# Patient Record
Sex: MALE | Race: WHITE | NOT HISPANIC OR LATINO | Employment: FULL TIME | URBAN - METROPOLITAN AREA
[De-identification: names, ages, dates, MRNs, and addresses within clinical notes are randomized per-mention and may not be internally consistent; named-entity substitution may affect disease eponyms.]

---

## 2017-01-27 ENCOUNTER — OFFICE VISIT (OUTPATIENT)
Dept: URGENT CARE | Facility: URGENT CARE | Age: 47
End: 2017-01-27
Payer: COMMERCIAL

## 2017-01-27 VITALS
HEART RATE: 107 BPM | DIASTOLIC BLOOD PRESSURE: 80 MMHG | BODY MASS INDEX: 23.08 KG/M2 | TEMPERATURE: 97.8 F | SYSTOLIC BLOOD PRESSURE: 148 MMHG | OXYGEN SATURATION: 98 % | WEIGHT: 142.9 LBS

## 2017-01-27 DIAGNOSIS — H57.12 EYE PAIN, LEFT: Primary | ICD-10-CM

## 2017-01-27 PROCEDURE — 99213 OFFICE O/P EST LOW 20 MIN: CPT | Performed by: FAMILY MEDICINE

## 2017-01-27 RX ORDER — CIPROFLOXACIN HYDROCHLORIDE 3.5 MG/ML
1 SOLUTION/ DROPS TOPICAL 3 TIMES DAILY
Qty: 1.1 ML | Refills: 0 | Status: SHIPPED | OUTPATIENT
Start: 2017-01-27 | End: 2017-02-03

## 2017-01-27 RX ORDER — HYDROCODONE BITARTRATE AND ACETAMINOPHEN 5; 325 MG/1; MG/1
1-2 TABLET ORAL EVERY 4 HOURS PRN
Qty: 20 TABLET | Refills: 0 | Status: SHIPPED | OUTPATIENT
Start: 2017-01-27 | End: 2017-02-23

## 2017-02-14 ENCOUNTER — APPOINTMENT (OUTPATIENT)
Dept: MRI IMAGING | Facility: CLINIC | Age: 47
End: 2017-02-14
Attending: EMERGENCY MEDICINE
Payer: COMMERCIAL

## 2017-02-14 ENCOUNTER — APPOINTMENT (OUTPATIENT)
Dept: GENERAL RADIOLOGY | Facility: CLINIC | Age: 47
End: 2017-02-14
Attending: EMERGENCY MEDICINE
Payer: COMMERCIAL

## 2017-02-14 ENCOUNTER — HOSPITAL ENCOUNTER (EMERGENCY)
Facility: CLINIC | Age: 47
Discharge: HOME | End: 2017-02-14
Attending: EMERGENCY MEDICINE | Admitting: EMERGENCY MEDICINE
Payer: COMMERCIAL

## 2017-02-14 VITALS
OXYGEN SATURATION: 100 % | HEIGHT: 66 IN | TEMPERATURE: 98 F | SYSTOLIC BLOOD PRESSURE: 124 MMHG | HEART RATE: 77 BPM | RESPIRATION RATE: 16 BRPM | BODY MASS INDEX: 22.5 KG/M2 | DIASTOLIC BLOOD PRESSURE: 84 MMHG | WEIGHT: 140 LBS

## 2017-02-14 DIAGNOSIS — R42 LIGHT HEADEDNESS: ICD-10-CM

## 2017-02-14 DIAGNOSIS — R20.0 ARM NUMBNESS: ICD-10-CM

## 2017-02-14 LAB
ALBUMIN SERPL-MCNC: 4 G/DL (ref 3.4–5)
ALBUMIN UR-MCNC: NEGATIVE MG/DL
ALP SERPL-CCNC: 69 U/L (ref 40–150)
ALT SERPL W P-5'-P-CCNC: 16 U/L (ref 0–70)
ANION GAP SERPL CALCULATED.3IONS-SCNC: 7 MMOL/L (ref 3–14)
APPEARANCE UR: CLEAR
AST SERPL W P-5'-P-CCNC: 10 U/L (ref 0–45)
BASOPHILS # BLD AUTO: 0 10E9/L (ref 0–0.2)
BASOPHILS NFR BLD AUTO: 0.2 %
BILIRUB SERPL-MCNC: 0.7 MG/DL (ref 0.2–1.3)
BILIRUB UR QL STRIP: NEGATIVE
BUN SERPL-MCNC: 10 MG/DL (ref 7–30)
CALCIUM SERPL-MCNC: 8.5 MG/DL (ref 8.5–10.1)
CHLORIDE SERPL-SCNC: 99 MMOL/L (ref 94–109)
CO2 SERPL-SCNC: 30 MMOL/L (ref 20–32)
COLOR UR AUTO: ABNORMAL
CREAT SERPL-MCNC: 0.84 MG/DL (ref 0.66–1.25)
DIFFERENTIAL METHOD BLD: NORMAL
EOSINOPHIL # BLD AUTO: 0.1 10E9/L (ref 0–0.7)
EOSINOPHIL NFR BLD AUTO: 0.6 %
ERYTHROCYTE [DISTWIDTH] IN BLOOD BY AUTOMATED COUNT: 12.6 % (ref 10–15)
GFR SERPL CREATININE-BSD FRML MDRD: ABNORMAL ML/MIN/1.7M2
GLUCOSE SERPL-MCNC: 106 MG/DL (ref 70–99)
GLUCOSE UR STRIP-MCNC: NEGATIVE MG/DL
HCT VFR BLD AUTO: 45.5 % (ref 40–53)
HGB BLD-MCNC: 15.8 G/DL (ref 13.3–17.7)
HGB UR QL STRIP: NEGATIVE
IMM GRANULOCYTES # BLD: 0 10E9/L (ref 0–0.4)
IMM GRANULOCYTES NFR BLD: 0.3 %
INTERPRETATION ECG - MUSE: NORMAL
KETONES UR STRIP-MCNC: NEGATIVE MG/DL
LEUKOCYTE ESTERASE UR QL STRIP: NEGATIVE
LYMPHOCYTES # BLD AUTO: 0.9 10E9/L (ref 0.8–5.3)
LYMPHOCYTES NFR BLD AUTO: 9.2 %
MCH RBC QN AUTO: 29.8 PG (ref 26.5–33)
MCHC RBC AUTO-ENTMCNC: 34.7 G/DL (ref 31.5–36.5)
MCV RBC AUTO: 86 FL (ref 78–100)
MONOCYTES # BLD AUTO: 0.7 10E9/L (ref 0–1.3)
MONOCYTES NFR BLD AUTO: 8 %
NEUTROPHILS # BLD AUTO: 7.6 10E9/L (ref 1.6–8.3)
NEUTROPHILS NFR BLD AUTO: 81.7 %
NITRATE UR QL: NEGATIVE
NRBC # BLD AUTO: 0 10*3/UL
NRBC BLD AUTO-RTO: 0 /100
PH UR STRIP: 7 PH (ref 5–7)
PLATELET # BLD AUTO: 258 10E9/L (ref 150–450)
POTASSIUM SERPL-SCNC: 3.7 MMOL/L (ref 3.4–5.3)
PROT SERPL-MCNC: 7.4 G/DL (ref 6.8–8.8)
RBC # BLD AUTO: 5.31 10E12/L (ref 4.4–5.9)
RBC #/AREA URNS AUTO: <1 /HPF (ref 0–2)
SODIUM SERPL-SCNC: 136 MMOL/L (ref 133–144)
SP GR UR STRIP: 1 (ref 1–1.03)
SQUAMOUS #/AREA URNS AUTO: <1 /HPF (ref 0–1)
TROPONIN I SERPL-MCNC: NORMAL UG/L (ref 0–0.04)
TSH SERPL DL<=0.005 MIU/L-ACNC: 1.07 MU/L (ref 0.4–4)
URN SPEC COLLECT METH UR: ABNORMAL
UROBILINOGEN UR STRIP-MCNC: NORMAL MG/DL (ref 0–2)
WBC # BLD AUTO: 9.3 10E9/L (ref 4–11)
WBC #/AREA URNS AUTO: 1 /HPF (ref 0–2)

## 2017-02-14 PROCEDURE — 70200 X-RAY EXAM OF EYE SOCKETS: CPT

## 2017-02-14 PROCEDURE — 85025 COMPLETE CBC W/AUTO DIFF WBC: CPT | Performed by: EMERGENCY MEDICINE

## 2017-02-14 PROCEDURE — 84484 ASSAY OF TROPONIN QUANT: CPT | Performed by: EMERGENCY MEDICINE

## 2017-02-14 PROCEDURE — 81001 URINALYSIS AUTO W/SCOPE: CPT | Performed by: EMERGENCY MEDICINE

## 2017-02-14 PROCEDURE — 80053 COMPREHEN METABOLIC PANEL: CPT | Performed by: EMERGENCY MEDICINE

## 2017-02-14 PROCEDURE — 25000128 H RX IP 250 OP 636: Performed by: EMERGENCY MEDICINE

## 2017-02-14 PROCEDURE — 84443 ASSAY THYROID STIM HORMONE: CPT | Performed by: EMERGENCY MEDICINE

## 2017-02-14 PROCEDURE — 70553 MRI BRAIN STEM W/O & W/DYE: CPT

## 2017-02-14 PROCEDURE — 93005 ELECTROCARDIOGRAM TRACING: CPT

## 2017-02-14 PROCEDURE — 96360 HYDRATION IV INFUSION INIT: CPT | Mod: 59

## 2017-02-14 PROCEDURE — 99285 EMERGENCY DEPT VISIT HI MDM: CPT | Mod: 25

## 2017-02-14 PROCEDURE — 25500064 ZZH RX 255 OP 636: Performed by: RADIOLOGY

## 2017-02-14 PROCEDURE — 96361 HYDRATE IV INFUSION ADD-ON: CPT

## 2017-02-14 PROCEDURE — A9585 GADOBUTROL INJECTION: HCPCS | Performed by: RADIOLOGY

## 2017-02-14 RX ORDER — GADOBUTROL 604.72 MG/ML
6 INJECTION INTRAVENOUS ONCE
Status: COMPLETED | OUTPATIENT
Start: 2017-02-14 | End: 2017-02-14

## 2017-02-14 RX ORDER — SODIUM CHLORIDE 9 MG/ML
1000 INJECTION, SOLUTION INTRAVENOUS CONTINUOUS
Status: DISCONTINUED | OUTPATIENT
Start: 2017-02-14 | End: 2017-02-14 | Stop reason: HOSPADM

## 2017-02-14 RX ADMIN — GADOBUTROL 6 ML: 604.72 INJECTION INTRAVENOUS at 11:56

## 2017-02-14 RX ADMIN — SODIUM CHLORIDE 1000 ML: 9 INJECTION, SOLUTION INTRAVENOUS at 10:32

## 2017-02-14 ASSESSMENT — ENCOUNTER SYMPTOMS
VOMITING: 0
NAUSEA: 0
HEADACHES: 0
NUMBNESS: 1
DIZZINESS: 1
WEAKNESS: 0
NECK PAIN: 0
BLOOD IN STOOL: 0
DIARRHEA: 1
LIGHT-HEADEDNESS: 1

## 2017-02-14 NOTE — ED PROVIDER NOTES
History     Chief Complaint:  Dizziness and Numbness    HPI   Nhan Bryan is a 46 year old male with a history of neurofibromatosis and an astrocytoma brain tumor who presents to the emergency department today for evaluation of dizziness and numbness. The patient has been feeling dizzy and with some numbness in his arms bilaterally for about the past 1.5 weeks. He feels that his dizziness is more of a lightheadedness and that it worsens when he doesn't eat as much. He has been experiencing diarrhea for the past few weeks with about one loose stool per day. He feels that this is improved after he gets his back adjusted by his chiropractor. He has had no blood in his stool. He also feels as though he is eating more than he usually does but is not gaining weight. He denies headache, nausea or vomiting. He does not feel weak stating that he feels he has energy. His legs feel fine and his walking has been normal. The patient has a benign left temporal brain tumor and associated seizures and notes he is taking generic Keppra to treat this. He has no neck pain.     Allergies:  No Known Drug Allergies     Medications:    Lisinopril  Keppra    Past Medical History:    Astrocytoma brain tumour  Epilepsy   Neurofibromatosis, peripheral   Seizure disorder     Past Surgical History:    External ear surgery     Family History:    Mother - Colorectal cancer  Father - Colorectal cancer    Social History:  The patient was accompanied to the ED by a family member.  Smoking Status: Former Smoker, quit in 2002  Smokeless Tobacco: Negative  Alcohol Use: Negative  Marital Status:  Single [1]     Review of Systems   Gastrointestinal: Positive for diarrhea. Negative for blood in stool, nausea and vomiting.   Musculoskeletal: Negative for neck pain.   Neurological: Positive for dizziness, light-headedness and numbness (bilateral arms). Negative for weakness and headaches.   All other systems reviewed and are negative.    Physical  "Exam   Vitals:   Patient Vitals for the past 24 hrs:   BP Temp Temp src Pulse SpO2 Height Weight   02/14/17 1300 124/84 - - - - - -   02/14/17 1254 130/82 - - - - - -   02/14/17 1232 (!) 140/98 - - - - - -   02/14/17 0939 (!) 140/97 98  F (36.7  C) Oral 75 98 % 1.676 m (5' 6\") 63.5 kg (140 lb)     Physical Exam    General: Resting comfortably on the gurney  Eyes:  The pupils are equal and round    Conjunctivae and sclerae are normal  ENT:    The nose is normal    Pinnae are normal    The oropharynx is normal  Neck:  Normal range of motion    There is no rigidity noted    There is no midline cervical spine tenderness    Trachea is in the midline  CV:  Regular rate and rhythm     No edema  Resp:  Lungs are clear    Non-labored    No rales    No wheezing   GI:  Abdomen is soft, there is no rigidity    No distension    No rebound tenderness   MS:  Normal muscular tone    No asymmetric leg swelling  Skin:  No rash noted    Multiple skin lesions throughout body.  Neuro:   Awake, alert, GCS 15    Speech is normal and fluent    Face is symmetric    Moves all extremities    Normal finger-nose-finger     strength equal bilaterally    Equal sensation bilaterally    Hip flexion 5/5 bilaterally    Emergency Department Course     ECG:  ECG taken at 1010, ECG read at 1015  Normal sinus rhythm  Left anterior fascicular block  Rate 81 bpm. NV interval 150. QRS duration 106. QT/QTc 378/439. P-R-T axes 46 -57 39.    Imaging:  Radiology findings were communicated with the patient who voiced understanding of the findings.    XR Orbits G/E 3 Views:   IMPRESSION: No radiodense foreign bodies are seen. No fractures  identified.  Reading per radiology    MR Brain w/o & w Contrast:   IMPRESSION:  1. No specific etiology of the dizziness bilateral arm numbness can be  identified.  2. No change in the small enhancing lesion at the anterior pole of the  left temporal lobe, probably a benign neoplasm versus a capillary  telangiectasia.  3. " Numerous subcutaneous nodules consistent with neurofibromatosis.  Reading per radiology    Laboratory:  Laboratory findings were communicated with the patient who voiced understanding of the findings.    Troponin (Collected 1033): <0.015  CMP: Glucose 106 (H) (Creatinine: 0.84)  CBC: AWNL. (WBC 9.3, HGB 15.8, )   TSH: 1.07    UA with Microscopic reflex to Culture: Specific Gravity Urine: 1.002 (L)    Interventions:  1032 ns 1000 mL IV     Emergency Department Course:  Nursing notes and vitals reviewed.  I performed an exam of the patient as documented above.   IV was inserted and blood was drawn for laboratory testing, results above.  The patient was sent for an x-ray and MRI while in the emergency department, results above.   The patient provided a urine sample here in the emergency department. This was sent for laboratory testing, findings above.  At 1234 the patient was rechecked and was updated on the results of his laboratory and imaging studies.  1258 Patient was updated on the result of his TSH.   I discussed the treatment plan with the patient. They expressed understanding of this plan and consented to discharge. They will be discharged home with instructions for care and follow up. In addition, the patient will return to the emergency department if their symptoms persist, worsen, if new symptoms arise or if there is any concern.  All questions were answered.  I personally reviewed the laboratory and imaging results with the patient and answered all related questions prior to discharge.    Impression & Plan      Medical Decision Making:  Nhan Bryan is a 46 year old male who presents to the emergency department with dizziness. 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 He's here with dizziness and bilateral arm numbness. This has been ongoing for about a week and a half. He feels lightheaded and it worsens when he does not have a full stomach. No chest pain or  trouble breathing. He has had some loose stools. His neurologic exam here is normal. Laboratory work up here as well as ECG showed no concerning findings. He does appear dehydrated and there's no evidence of infection, troponin elevation, thyroid abnormality, anemia, or white blood cell count elevation. MRI was obtained to ensure no evidence of worsening tumor or other finding that would cause these symptoms centrally and this was fortunately negative. Overall, the patient's vital signs appear normal. Work up here was overall benign. Unclear of the exact cause but the patient will continue to drink plenty of fluids and follow up with his doctor. He is discharged to home.     Diagnosis:    ICD-10-CM    1. Light headedness R42    2. Arm numbness R20.0      Disposition:   Discharge to home    Scribe Disclosure:  Radha LOPEZ, am serving as a scribe at 9:48 AM on 2/14/2017 to document services personally performed by Wicho Em MD, based on my observations and the provider's statements to me.    2/14/2017    EMERGENCY DEPARTMENT       Wicho Em MD  02/14/17 9364

## 2017-02-14 NOTE — ED AVS SNAPSHOT
Emergency Department    [address] [facility] [address]    Phone:  [phone]    Fax:  [phone]                                       Nhan Bryan   MRN: [id number]    Department:   Emergency Department   Date of Visit:  2/14/2017           Patient Information     Date Of Birth          1970        Your diagnoses for this visit were:     Light headedness     Arm numbness        You were seen by Wicho Em MD.      Follow-up Information     Follow up with Claudy Juarez MD In 3 days.    Specialty:  Family Practice    Contact information:    [facility]  [address]  [facility] [address] [phone]          Follow up with  Emergency Department.    Specialty:  EMERGENCY MEDICINE    Why:  As needed    Contact information:    [address] [facility] [address] [phone]        Discharge Instructions         Dizziness (Uncertain Cause)  Dizziness is a common symptom. It may be described as lightheadedness, spinning, or feeling like you are going to faint. Dizziness can have many causes.  Be sure to tell the healthcare provider about:    All medicines you take, including prescription, over-the-counter, herbs, and supplements    Any other symptoms you have    Any health problems you are being treated for    Anything that causes the dizziness to get worse or better  Today's exam did not show an exact cause for your dizziness. Other tests may be needed. Follow up with your healthcare provider.  Home care    Dizziness that occurs with sudden standing may be a sign of mild dehydration. Drink extra fluids for the next few days.    If you recently started a new medicine, stopped a medicine, or had the dose of a current medicine changed, talk with the prescribing healthcare provider. Your medicine plan may need adjustment.    If dizziness lasts more than a few seconds, sit or lie down until it passes. This may help prevent  injury in case you pass out.    Do not drive or use power tools or dangerous equipment until you have had no dizziness for at least 48 hours.  Follow-up care  Follow up with your healthcare provider for further evaluation within the next 7 days or as advised.  When to seek medical advice  Call your healthcare provider for any of the following:    Worsening of symptoms or new symptoms    Passing out or seizure    Repeated vomiting    Headache    Palpitations (the sense that your heart is fluttering or beating fast or hard)    Shortness of breath    Blood in vomit or stool (black or red color)    Weakness of an arm or leg or one side of the face    Vision or hearing changes    Trouble walking or speaking    Chest, arm, neck, back, or jaw pain       3158-6968 [employer]. [address] [place] [address], [place] [address]. All rights reserved. This information is not intended as a substitute for professional medical care. Always follow your healthcare professional's instructions.          24 Hour Appointment Hotline       To make an appointment at any [facility], call [phone] ([phone]). If you don't have a family doctor or clinic, we will help you find one. [facility] are conveniently located to serve the needs of you and your family.             Review of your medicines      Our records show that you are taking the medicines listed below. If these are incorrect, please call your family doctor or clinic.        Dose / Directions Last dose taken    HYDROcodone-acetaminophen 5-325 MG per tablet   Commonly known as:  NORCO   Dose:  1-2 tablet   Quantity:  20 tablet        Take 1-2 tablets by mouth every 4 hours as needed for moderate to severe pain maximum 3 tablet(s) per day   Refills:  0        * levETIRAcetam 500 MG tablet   Commonly known as:  KEPPRA   Dose:  500 mg   Quantity:  60 tablet        Take 1 tablet (500 mg) by mouth At Bedtime   Refills:  0        * levETIRAcetam 750 MG tablet    Commonly known as:  KEPPRA   Dose:  1500 mg        Take 2 tablets (1,500 mg) by mouth 2 times daily   Refills:  0        lisinopril 20 MG tablet   Commonly known as:  PRINIVIL/ZESTRIL   Dose:  20 mg   Quantity:  30 tablet        Take 1 tablet (20 mg) by mouth daily   Refills:  11        * Notice:  This list has 2 medication(s) that are the same as other medications prescribed for you. Read the directions carefully, and ask your doctor or other care provider to review them with you.            Procedures and tests performed during your visit     CBC with platelets + differential    Comprehensive metabolic panel    EKG 12-lead, tracing only    MR Brain w/o & w Contrast    TSH with free T4 reflex    Troponin I    UA with Microscopic reflex to Culture    XR Orbits G/E 3 Views      Orders Needing Specimen Collection     None      Pending Results     Date and Time Order Name Status Description    2/14/2017 1001 MR Brain w/o & w Contrast Preliminary             Pending Culture Results     No orders found from 2/12/2017 to 2/15/2017.             Test Results from your hospital stay     2/14/2017 10:44 AM - Interface, [facility] Results      Component Results     Component Value Ref Range & Units Status    WBC 9.3 4.0 - 11.0 10e9/L Final    RBC Count 5.31 4.4 - 5.9 10e12/L Final    Hemoglobin 15.8 13.3 - 17.7 g/dL Final    Hematocrit 45.5 40.0 - 53.0 % Final    MCV 86 78 - 100 fl Final    MCH 29.8 26.5 - 33.0 pg Final    MCHC 34.7 31.5 - 36.5 g/dL Final    RDW 12.6 10.0 - 15.0 % Final    Platelet Count 258 150 - 450 10e9/L Final    Diff Method Automated Method  Final    % Neutrophils 81.7 % Final    % Lymphocytes 9.2 % Final    % Monocytes 8.0 % Final    % Eosinophils 0.6 % Final    % Basophils 0.2 % Final    % Immature Granulocytes 0.3 % Final    Nucleated RBCs 0 0 /100 Final    Absolute Neutrophil 7.6 1.6 - 8.3 10e9/L Final    Absolute Lymphocytes 0.9 0.8 - 5.3 10e9/L Final    Absolute Monocytes 0.7 0.0 - 1.3 10e9/L Final     Absolute Eosinophils 0.1 0.0 - 0.7 10e9/L Final    Absolute Basophils 0.0 0.0 - 0.2 10e9/L Final    Abs Immature Granulocytes 0.0 0 - 0.4 10e9/L Final    Absolute Nucleated RBC 0.0  Final         2/14/2017 11:04 AM - Interface, [employer] Results      Component Results     Component Value Ref Range & Units Status    Sodium 136 133 - 144 mmol/L Final    Potassium 3.7 3.4 - 5.3 mmol/L Final    Chloride 99 94 - 109 mmol/L Final    Carbon Dioxide 30 20 - 32 mmol/L Final    Anion Gap 7 3 - 14 mmol/L Final    Glucose 106 (H) 70 - 99 mg/dL Final    Urea Nitrogen 10 7 - 30 mg/dL Final    Creatinine 0.84 0.66 - 1.25 mg/dL Final    GFR Estimate >90  Non  GFR Calc   >60 mL/min/1.7m2 Final    GFR Estimate If Black >90   GFR Calc   >60 mL/min/1.7m2 Final    Calcium 8.5 8.5 - 10.1 mg/dL Final    Bilirubin Total 0.7 0.2 - 1.3 mg/dL Final    Albumin 4.0 3.4 - 5.0 g/dL Final    Protein Total 7.4 6.8 - 8.8 g/dL Final    Alkaline Phosphatase 69 40 - 150 U/L Final    ALT 16 0 - 70 U/L Final    AST 10 0 - 45 U/L Final         2/14/2017 12:23 PM - Interface, Radiant Ib      Narrative     MRI BRAIN WITHOUT AND WITH CONTRAST February 14, 2017 12:12 PM    HISTORY: Neurofibromatosis type I. History of left temporal lobe brain  tumor. Dizziness and bilateral arm numbness.     TECHNIQUE: Multiplanar, multisequence MRI of the brain without and  with 6mL Gadavist.    COMPARISON: MRI 10/9/2004.    FINDINGS: Extensive subcutaneous nodules are noted consistent with the  clinically apparent neurofibromatosis type I.    The lesion in the left temporal lobe anteriorly shows relatively  poorly marginated gadolinium enhancement, 0.7 x 0.5 x 0.6 cm diameter,  abutting the cortex. This is unchanged back to 2004 and thus is  benign. No other gadolinium enhancing lesions are seen in the brain or  meninges. Ventricles and subarachnoid spaces appear normal. The  arteries at the base of the brain and the dural venous sinuses  are  patent. Facial structures are unremarkable. No evidence of skull base  abnormality.        Impression     IMPRESSION:  1. No specific etiology of the dizziness bilateral arm numbness can be  identified.  2. No change in the small enhancing lesion at the anterior pole of the  left temporal lobe, probably a benign neoplasm versus a capillary  telangiectasia.  3. Numerous subcutaneous nodules consistent with neurofibromatosis.         2/14/2017 10:46 AM - Interface, [facility] Results      Component Results     Component Value Ref Range & Units Status    Color Urine Light Yellow  Final    Appearance Urine Clear  Final    Glucose Urine Negative NEG mg/dL Final    Bilirubin Urine Negative NEG Final    Ketones Urine Negative NEG mg/dL Final    Specific Gravity Urine 1.002 (L) 1.003 - 1.035 Final    Blood Urine Negative NEG Final    pH Urine 7.0 5.0 - 7.0 pH Final    Protein Albumin Urine Negative NEG mg/dL Final    Urobilinogen mg/dL Normal 0.0 - 2.0 mg/dL Final    Nitrite Urine Negative NEG Final    Leukocyte Esterase Urine Negative NEG Final    Source Midstream Urine  Final    WBC Urine 1 0 - 2 /HPF Final    RBC Urine <1 0 - 2 /HPF Final    Squamous Epithelial /HPF Urine <1 0 - 1 /HPF Final         2/14/2017 11:04 AM - Interface, Flexilab Results      Component Results     Component Value Ref Range & Units Status    Troponin I ES  0.000 - 0.045 ug/L Final    <0.015  The 99th percentile for upper reference range is 0.045 ug/L.  Troponin values in   the range of 0.045 - 0.120 ug/L may be associated with risks of adverse   clinical events.           2/14/2017 11:07 AM - Interface, Radiant Ib      Narrative     XR ORBITS G/E 4 VW 2/14/2017 10:54 AM    COMPARISON: None.    HISTORY: Concern for metallic foreign body in the orbits prior to MRI.        Impression     IMPRESSION: No radiodense foreign bodies are seen. No fractures  identified.    GERARDO NELSON         2/14/2017 12:58 PM - Interface, [facility] Results       Component Results     Component Value Ref Range & Units Status    TSH 1.07 0.40 - 4.00 mU/L Final                Clinical Quality Measure: Blood Pressure Screening     Your blood pressure was checked while you were in the emergency department today. The last reading we obtained was  BP: 124/84 . Please read the guidelines below about what these numbers mean and what you should do about them.  If your systolic blood pressure (the top number) is less than 120 and your diastolic blood pressure (the bottom number) is less than 80, then your blood pressure is normal. There is nothing more that you need to do about it.  If your systolic blood pressure (the top number) is 120-139 or your diastolic blood pressure (the bottom number) is 80-89, your blood pressure may be higher than it should be. You should have your blood pressure rechecked within a year by a primary care provider.  If your systolic blood pressure (the top number) is 140 or greater or your diastolic blood pressure (the bottom number) is 90 or greater, you may have high blood pressure. High blood pressure is treatable, but if left untreated over time it can put you at risk for heart attack, stroke, or kidney failure. You should have your blood pressure rechecked by a primary care provider within the next 4 weeks.  If your provider in the emergency department today gave you specific instructions to follow-up with your doctor or provider even sooner than that, you should follow that instruction and not wait for up to 4 weeks for your follow-up visit.        Thank you for choosing [facility]       Thank you for choosing [facility] for your care. Our goal is always to provide you with excellent care. Hearing back from our patients is one way we can continue to improve our services. Please take a few minutes to complete the written survey that you may receive in the mail after you visit with us. Thank you!        [employer] Information     [employer] gives you secure  access to your electronic health record. If you see a primary care provider, you can also send messages to your care team and make appointments. If you have questions, please call your primary care clinic.  If you do not have a primary care provider, please call [phone] and they will assist you.        Care EveryWhere ID     This is your Care EveryWhere ID. This could be used by other organizations to access your [facility] medical records  [id number]        After Visit Summary       This is your record. Keep this with you and show to your community pharmacist(s) and doctor(s) at your next visit.

## 2017-02-14 NOTE — ED AVS SNAPSHOT
Emergency Department    [address] [facility] [address]    Phone:  [phone]    Fax:  [phone]                                       Nhan Bryan   MRN: [id number]    Department:   Emergency Department   Date of Visit:  2/14/2017           After Visit Summary Signature Page     I have received my discharge instructions, and my questions have been answered. I have discussed any challenges I see with this plan with the nurse or doctor.    ..........................................................................................................................................  Patient/Patient Representative Signature      ..........................................................................................................................................  Patient Representative Print Name and Relationship to Patient    ..................................................               ................................................  Date                                            Time    ..........................................................................................................................................  Reviewed by Signature/Title    ...................................................              ..............................................  Date                                                            Time

## 2017-02-14 NOTE — DISCHARGE INSTRUCTIONS
Dizziness (Uncertain Cause)  Dizziness is a common symptom. It may be described as lightheadedness, spinning, or feeling like you are going to faint. Dizziness can have many causes.  Be sure to tell the healthcare provider about:    All medicines you take, including prescription, over-the-counter, herbs, and supplements    Any other symptoms you have    Any health problems you are being treated for    Anything that causes the dizziness to get worse or better  Today's exam did not show an exact cause for your dizziness. Other tests may be needed. Follow up with your healthcare provider.  Home care    Dizziness that occurs with sudden standing may be a sign of mild dehydration. Drink extra fluids for the next few days.    If you recently started a new medicine, stopped a medicine, or had the dose of a current medicine changed, talk with the prescribing healthcare provider. Your medicine plan may need adjustment.    If dizziness lasts more than a few seconds, sit or lie down until it passes. This may help prevent injury in case you pass out.    Do not drive or use power tools or dangerous equipment until you have had no dizziness for at least 48 hours.  Follow-up care  Follow up with your healthcare provider for further evaluation within the next 7 days or as advised.  When to seek medical advice  Call your healthcare provider for any of the following:    Worsening of symptoms or new symptoms    Passing out or seizure    Repeated vomiting    Headache    Palpitations (the sense that your heart is fluttering or beating fast or hard)    Shortness of breath    Blood in vomit or stool (black or red color)    Weakness of an arm or leg or one side of the face    Vision or hearing changes    Trouble walking or speaking    Chest, arm, neck, back, or jaw pain       [id number] [employer]. [address] [place] [address], [place] [address]. All rights reserved. This information is not intended as a substitute for  professional medical care. Always follow your healthcare professional's instructions.

## 2017-02-23 ENCOUNTER — OFFICE VISIT (OUTPATIENT)
Dept: FAMILY MEDICINE | Facility: CLINIC | Age: 47
End: 2017-02-23

## 2017-02-23 VITALS
HEART RATE: 105 BPM | OXYGEN SATURATION: 98 % | WEIGHT: 142 LBS | BODY MASS INDEX: 22.92 KG/M2 | DIASTOLIC BLOOD PRESSURE: 78 MMHG | SYSTOLIC BLOOD PRESSURE: 128 MMHG

## 2017-02-23 DIAGNOSIS — G40.909 SEIZURE DISORDER (H): ICD-10-CM

## 2017-02-23 DIAGNOSIS — C71.9 ASTROCYTOMA BRAIN TUMOR (H): ICD-10-CM

## 2017-02-23 DIAGNOSIS — F32.1 MODERATE SINGLE CURRENT EPISODE OF MAJOR DEPRESSIVE DISORDER (H): Primary | ICD-10-CM

## 2017-02-23 DIAGNOSIS — Q85.01 NEUROFIBROMATOSIS, PERIPHERAL, NF1 (H): ICD-10-CM

## 2017-02-23 PROCEDURE — 99214 OFFICE O/P EST MOD 30 MIN: CPT | Performed by: FAMILY MEDICINE

## 2017-02-23 NOTE — MR AVS SNAPSHOT
After Visit Summary   2/23/2017    Nhan Bryan    MRN: [id number]           Patient Information     Date Of Birth          1970        Visit Information        Provider Department      2/23/2017 12:00 PM Claudy Juarez MD [facility]        Today's Diagnoses     Moderate single current episode of major depressive disorder (H)    -  1    Astrocytoma brain tumor (H)        Seizure disorder (H)        Neurofibromatosis, peripheral, NF1 (H)          Care Instructions                 Depression  What is major depression?   Depression is a condition in which you feel sad, hopeless, and uninterested in daily life. Major depression is severe depression that lasts for at least 2 full weeks.   How does it occur?   Major depression may start after some event or it may not be caused by anything specific. You may have major depression after a period of having dysthymia. Dysthymia is being mildly depressed almost every day for 2 or more years. If major depression develops from dysthymia, you are more likely to have major depression in the future.   People are more likely to develop depression if they:   have family members who have had depression, bipolar disorder, or anxiety problems   are female. Women are twice as likely as men to have major depression   have a major medical problem such as heart disease or cancer   The chemicals in your nervous system and the way that brain cells communicate changes with major depression. Exactly how this works and what it means are not fully understood.   Major depression may start at any age. Teenagers and young adults, as well as older adults, are more likely to have this condition than middle-aged adults.   What are the symptoms?   Besides feeling very sad and uninterested in things you usually enjoy, you may also:   be irritable   have trouble falling asleep, wake up very early, or sleep too much   feel more anxiety or panic   notice changes  in your appetite and weight, either up or down   notice changes in your energy level, usually down but sometimes feeling overexcited   lose sexual desire and function   feel worthless and guilty   have trouble concentrating or remembering things   feel hopeless or just not care about anything   have unexplained physical symptoms   think often about death or suicide   Other symptoms may vary with age. If you are a teenager, you may be irritable, get angry, abuse substances, and cause trouble with parents and at school. If you are a young or middle-aged adult, you may abuse substances such as drugs or alcohol, have physical problems (like pain or stomach upsets), or feel nervous.   Depressed older people are more likely to complain of physical problems than that they are feeling sad, anxious, or hopeless. Tiredness, mood changes, sleepiness, and memory problems may be side effects of medicines rather than symptoms of depression. Other medical conditions, such as diabetes, heart disease, and Alzheimer's disease, can also cause similar symptoms.   How is it diagnosed?   Your healthcare provider or a mental health professional will ask about your symptoms and any drug or alcohol use. You may have lab tests to rule out medical problems such as hormone imbalances. There are no lab tests that directly diagnose depression.   How is it treated?   Do not try to overcome clinical depression by yourself. It can usually be successfully treated with psychotherapy, antidepressant medicine, or both. Discuss this with your healthcare provider or therapist.   Medicine  Several types of prescription medicines can help treat major depression. Your healthcare provider will work with you to carefully select the right medicine for you.   You must take these medicines daily for 3 to 6 weeks to get full benefit from them. Most people benefit from taking these medicines for at least 6 months.   No nonprescription medicines are effective to  treat major depression.   Psychotherapy   Seeing a mental health therapist can help with all forms of depression. You may need therapy for a short time or for many months. One very helpful form of psychotherapy is cognitive behavioral therapy (CBT). CBT helps you identify and change thought processes that can lead to depression. Replacing negative thoughts with more positive ones reduces depression. Interpersonal therapy has also been shown to work very well.   Diets rich in fruits and vegetables are recommended for people with depression. A multivitamin and mineral supplement may also be recommended.   Claims have been made that certain herbal and dietary products help control depression symptoms. Omega-3 fatty acids may help to reduce symptoms of depression. Savana's wort may help mild symptoms of depression. It will not help severe cases of depression. It may worsen bipolar disorder. No herb or dietary supplement has been proven to consistently or completely relieve depression. Supplements are not tested or standardized and may vary in strengths and effects. They may have side effects and are not always safe.   Learning ways to relax may help. Yoga and meditation may also be helpful. You may want to talk with your healthcare provider about using these methods along with medicines and psychotherapy.   How long will the effects last?   Major depression usually improves within a few weeks. Some people have it only once, while others have many episodes. Major depression can be shortened, and possibly prevented, with treatment.   What can I do to help myself or my loved one?   Seeking treatment quickly is the best thing to do. Watch closely for the signs of depression. Get treatment before the symptoms become bad.   Certain medicines can add to the symptoms of depression. If you have had depression, tell all healthcare providers who treat you about all medicines you are taking, including nonprescription products and  natural remedies.   Maintaining a healthy lifestyle and social activities are most important. To help prevent depression:   Exercise for at least 30 minutes every day, for example a brisk walk.   Learn which activities make you feel better and do them often.   Talk to your family and friends.   Eat a healthy diet.   Avoid alcohol, caffeine, and nicotine.   Do not use drugs.   Learn ways to lower stress, such as breathing and muscle relaxation exercises.   When should I seek help?   If you are showing the signs of major depression, seek professional help quickly. Do not try to treat your depression by yourself. Professional treatment is necessary.   Most of the time, you will feel much better after a few weeks of treatment. Some people with untreated major depression commit suicide. Many more attempt suicide or try to hurt themselves. After treatment and feeling better, these same people usually cannot believe that once they felt so bad and wanted to die.   Get emergency care if you or a loved one has serious thoughts of suicide or harming others.   For more information, see:   Depression: Its Symptoms and Treatment  Adjustment Disorders with Depressed Mood  Cognitive Therapy    Published by [employer].  This content is reviewed periodically and is subject to change as new health information becomes available. The information is intended to inform and educate and is not a replacement for medical evaluation, advice, diagnosis or treatment by a healthcare professional.   Written by Wendy Church, PhD, for [employer].   ? 2010 [employer] and/or its affiliates. All Rights Reserved.   Copyright   [employer] 2011  Adult Health Advisor              Follow-ups after your visit        Who to contact     If you have questions or need follow up information about today's clinic visit or your schedule please contact [facility] directly at [phone].  Normal or non-critical lab and imaging results  will be communicated to you by [email or web address], letter or phone within 4 business days after the clinic has received the results. If you do not hear from us within 7 days, please contact the clinic through [email or web address] or phone. If you have a critical or abnormal lab result, we will notify you by phone as soon as possible.  Submit refill requests through [email or web address] or call your pharmacy and they will forward the refill request to us. Please allow 3 business days for your refill to be completed.          Additional Information About Your Visit        [email or web address] Information     [email or web address] gives you secure access to your electronic health record. If you see a primary care provider, you can also send messages to your care team and make appointments. If you have questions, please call your primary care clinic.  If you do not have a primary care provider, please call [phone] and they will assist you.        Care EveryWhere ID     This is your Care EveryWhere ID. This could be used by other organizations to access your [facility] medical records  [id number]        Your Vitals Were     Pulse Pulse Oximetry BMI (Body Mass Index)             105 98% 22.92 kg/m2          Blood Pressure from Last 3 Encounters:   02/23/17 128/78   02/14/17 124/84   01/27/17 148/80    Weight from Last 3 Encounters:   02/23/17 64.4 kg (142 lb)   02/14/17 63.5 kg (140 lb)   01/27/17 64.8 kg (142 lb 14.4 oz)              Today, you had the following     No orders found for display         Today's Medication Changes          These changes are accurate as of: 2/23/17 12:25 PM.  If you have any questions, ask your nurse or doctor.               Start taking these medicines.        Dose/Directions    sertraline 50 MG tablet   Commonly known as:  ZOLOFT   Used for:  Moderate single current episode of major depressive disorder (H)   Started by:  Claudy Juarez MD        Dose:  50 mg   Take 1 tablet (50 mg) by mouth daily Start with a half pill for the first month    Quantity:  30 tablet   Refills:  1            Where to get your medicines      These medications were sent to [id number] - [place], [place] - [address] AT [facility] [address] [facility] [address]    Hours:  24-hours Phone:  [phone]     sertraline 50 MG tablet                Primary Care Provider Office Phone # Fax #    Claudy Juarez [phone]       [facility] [address]  [facility] [id number]        Thank you!     Thank you for choosing [facility]  for your care. Our goal is always to provide you with excellent care. Hearing back from our patients is one way we can continue to improve our services. Please take a few minutes to complete the written survey that you may receive in the mail after your visit with us. Thank you!             Your Updated Medication List - Protect others around you: Learn how to safely use, store and throw away your medicines at www.disposemymeds.org.          This list is accurate as of: 2/23/17 12:25 PM.  Always use your most recent med list.                   Brand Name Dispense Instructions for use    * levETIRAcetam 500 MG tablet    KEPPRA    60 tablet    Take 1 tablet (500 mg) by mouth At Bedtime       * levETIRAcetam 750 MG tablet    KEPPRA     Take 2 tablets (1,500 mg) by mouth 2 times daily       lisinopril 20 MG tablet    PRINIVIL/ZESTRIL    30 tablet    Take 1 tablet (20 mg) by mouth daily       sertraline 50 MG tablet    ZOLOFT    30 tablet    Take 1 tablet (50 mg) by mouth daily Start with a half pill for the first month       * Notice:  This list has 2 medication(s) that are the same as other medications prescribed for you. Read the directions carefully, and ask your doctor or other care provider to review them with you.

## 2017-02-23 NOTE — PROGRESS NOTES
Change in medication order below--Sig  for Sertraline 50 mg should be take 1/2 of a pill for the first week not month.  Patient knows of change.

## 2017-02-23 NOTE — PATIENT INSTRUCTIONS
Depression  What is major depression?   Depression is a condition in which you feel sad, hopeless, and uninterested in daily life. Major depression is severe depression that lasts for at least 2 full weeks.   How does it occur?   Major depression may start after some event or it may not be caused by anything specific. You may have major depression after a period of having dysthymia. Dysthymia is being mildly depressed almost every day for 2 or more years. If major depression develops from dysthymia, you are more likely to have major depression in the future.   People are more likely to develop depression if they:   have family members who have had depression, bipolar disorder, or anxiety problems   are female. Women are twice as likely as men to have major depression   have a major medical problem such as heart disease or cancer   The chemicals in your nervous system and the way that brain cells communicate changes with major depression. Exactly how this works and what it means are not fully understood.   Major depression may start at any age. Teenagers and young adults, as well as older adults, are more likely to have this condition than middle-aged adults.   What are the symptoms?   Besides feeling very sad and uninterested in things you usually enjoy, you may also:   be irritable   have trouble falling asleep, wake up very early, or sleep too much   feel more anxiety or panic   notice changes in your appetite and weight, either up or down   notice changes in your energy level, usually down but sometimes feeling overexcited   lose sexual desire and function   feel worthless and guilty   have trouble concentrating or remembering things   feel hopeless or just not care about anything   have unexplained physical symptoms   think often about death or suicide   Other symptoms may vary with age. If you are a teenager, you may be irritable, get angry, abuse substances, and cause trouble with parents and at  school. If you are a young or middle-aged adult, you may abuse substances such as drugs or alcohol, have physical problems (like pain or stomach upsets), or feel nervous.   Depressed older people are more likely to complain of physical problems than that they are feeling sad, anxious, or hopeless. Tiredness, mood changes, sleepiness, and memory problems may be side effects of medicines rather than symptoms of depression. Other medical conditions, such as diabetes, heart disease, and Alzheimer's disease, can also cause similar symptoms.   How is it diagnosed?   Your healthcare provider or a mental health professional will ask about your symptoms and any drug or alcohol use. You may have lab tests to rule out medical problems such as hormone imbalances. There are no lab tests that directly diagnose depression.   How is it treated?   Do not try to overcome clinical depression by yourself. It can usually be successfully treated with psychotherapy, antidepressant medicine, or both. Discuss this with your healthcare provider or therapist.   Medicine  Several types of prescription medicines can help treat major depression. Your healthcare provider will work with you to carefully select the right medicine for you.   You must take these medicines daily for 3 to 6 weeks to get full benefit from them. Most people benefit from taking these medicines for at least 6 months.   No nonprescription medicines are effective to treat major depression.   Psychotherapy   Seeing a mental health therapist can help with all forms of depression. You may need therapy for a short time or for many months. One very helpful form of psychotherapy is cognitive behavioral therapy (CBT). CBT helps you identify and change thought processes that can lead to depression. Replacing negative thoughts with more positive ones reduces depression. Interpersonal therapy has also been shown to work very well.   Diets rich in fruits and vegetables are recommended  for people with depression. A multivitamin and mineral supplement may also be recommended.   Claims have been made that certain herbal and dietary products help control depression symptoms. Omega-3 fatty acids may help to reduce symptoms of depression. Savana's wort may help mild symptoms of depression. It will not help severe cases of depression. It may worsen bipolar disorder. No herb or dietary supplement has been proven to consistently or completely relieve depression. Supplements are not tested or standardized and may vary in strengths and effects. They may have side effects and are not always safe.   Learning ways to relax may help. Yoga and meditation may also be helpful. You may want to talk with your healthcare provider about using these methods along with medicines and psychotherapy.   How long will the effects last?   Major depression usually improves within a few weeks. Some people have it only once, while others have many episodes. Major depression can be shortened, and possibly prevented, with treatment.   What can I do to help myself or my loved one?   Seeking treatment quickly is the best thing to do. Watch closely for the signs of depression. Get treatment before the symptoms become bad.   Certain medicines can add to the symptoms of depression. If you have had depression, tell all healthcare providers who treat you about all medicines you are taking, including nonprescription products and natural remedies.   Maintaining a healthy lifestyle and social activities are most important. To help prevent depression:   Exercise for at least 30 minutes every day, for example a brisk walk.   Learn which activities make you feel better and do them often.   Talk to your family and friends.   Eat a healthy diet.   Avoid alcohol, caffeine, and nicotine.   Do not use drugs.   Learn ways to lower stress, such as breathing and muscle relaxation exercises.   When should I seek help?   If you are showing the signs  of major depression, seek professional help quickly. Do not try to treat your depression by yourself. Professional treatment is necessary.   Most of the time, you will feel much better after a few weeks of treatment. Some people with untreated major depression commit suicide. Many more attempt suicide or try to hurt themselves. After treatment and feeling better, these same people usually cannot believe that once they felt so bad and wanted to die.   Get emergency care if you or a loved one has serious thoughts of suicide or harming others.   For more information, see:   Depression: Its Symptoms and Treatment  Adjustment Disorders with Depressed Mood  Cognitive Therapy    Published by [facility].  This content is reviewed periodically and is subject to change as new health information becomes available. The information is intended to inform and educate and is not a replacement for medical evaluation, advice, diagnosis or treatment by a healthcare professional.   Written by Wendy Church, PhD, for [facility].   ? 2010 [facility] and/or its affiliates. All Rights Reserved.   Copyright   [employer] 2011  Adult Health Advisor

## 2017-02-23 NOTE — PROGRESS NOTES
SUBJECTIVE:                                                      Patient presents for Hospital Followup Visit:    Hospital:  [facility]      Date of Admission: 2/14/17  Date of Discharge: 2/14/17  Reason(s) for Admission: Dizziness and numbness of arms            Problems taking medications regularly:  None       Medication changes since discharge: None       Problems adhering to non-medication therapy:  None    Summary of hospitalization:  [facility] ER notes s  Diagnostic Tests/Treatments reviewed.  Follow up needed: none  Other Healthcare Providers Involved in Patient s Care:         None  Update since discharge: improved.   Depression:  Has known history of depression.  Has been on medication for this.  The patient does not report any significant side effects of this medication.  The prior symptoms leading to the original diagnosis and decision to start medication therapy are better.     Appetite is stable.  Sleeping patterns are stable.  No reported thoughts of suicide or homicide.  REVFSREFRESH(342:3)  Last PHQ-9 score on record=   PHQ-9 SCORE 2/23/2017   Total Score 17                 2. Astrocytoma brain tumor (H)  Just had an mri and no signs of growth   3. Seizure disorder (H)  Last sz was in 2004   4. Neurofibromatosis, peripheral, NF1 (H)  Gets a little worse with stress     ROS:  Constitutional, neuro, ENT, endocrine, pulmonary, cardiac, gastrointestinal, genitourinary, musculoskeletal, integument and psychiatric systems are negative, except as otherwise noted.    OBJECTIVE:                                                      APPEARANCE: = Relaxed and in no distress  Conj/Eyelids = noninjected and lids and lashes are without inflammation  PERRLA/Irises = Pupils Round Reactive to Light and Irisis without inflammation  Ears/Nose = External structures and Nares have usual shape and form  Ear canals and TM's = Canals are not inflammed and have none or little wax and the drums are  not injected and have a light reflex   Lips/Teeth/Gums = No lesions seen, good dentition, and gums seem healthy  Oropharynx = No leukoplakia, No injection to the tissues, Normal Uvula  Neck = No anterior or posterior adenopathy appreciated.  Resp effort = Calm regular breathing  Breath Sounds = Good air movement with no rales or rhonchi in any lung fields  Mood/Affect = Cooperative and interested        ASSESSMENT/PLAN:                                                      There are no diagnoses linked to this encounter.     Post Discharge Medication Reconciliation: discharge medications reconciled and changed, per note/orders (see AVS).  Issues to address: Issues identified were:   see INstructions.  Plan of care communicated with patient  Assessment/Plan:  Nhan was seen today for er f/u, insomnia, anxiety and depression.    Diagnoses and all orders for this visit:    Moderate single current episode of major depressive disorder (H)  -     sertraline (ZOLOFT) 50 MG tablet; Take 1 tablet (50 mg) by mouth daily Start with a half pill for the first month    Astrocytoma brain tumor (H)    Seizure disorder (H)    Neurofibromatosis, peripheral, NF1 (H)      Claudy Juarez MD  [facility]  [phone]

## 2017-02-24 ASSESSMENT — PATIENT HEALTH QUESTIONNAIRE - PHQ9: SUM OF ALL RESPONSES TO PHQ QUESTIONS 1-9: 17

## 2017-04-07 ENCOUNTER — OFFICE VISIT (OUTPATIENT)
Dept: FAMILY MEDICINE | Facility: CLINIC | Age: 47
End: 2017-04-07

## 2017-04-07 VITALS
BODY MASS INDEX: 23.08 KG/M2 | OXYGEN SATURATION: 99 % | DIASTOLIC BLOOD PRESSURE: 78 MMHG | WEIGHT: 143 LBS | SYSTOLIC BLOOD PRESSURE: 118 MMHG | HEART RATE: 81 BPM

## 2017-04-07 DIAGNOSIS — F32.1 MODERATE SINGLE CURRENT EPISODE OF MAJOR DEPRESSIVE DISORDER (H): ICD-10-CM

## 2017-04-07 PROCEDURE — 99213 OFFICE O/P EST LOW 20 MIN: CPT | Performed by: FAMILY MEDICINE

## 2017-04-07 RX ORDER — SERTRALINE HYDROCHLORIDE 100 MG/1
100 TABLET, FILM COATED ORAL DAILY
Qty: 30 TABLET | Refills: 3 | Status: SHIPPED | OUTPATIENT
Start: 2017-04-07 | End: 2017-08-28

## 2017-04-07 NOTE — PROGRESS NOTES
"Depression:  Has known history of depression.  Has been on medication for this.  The patient does not report any significant side effects of this medication.  The prior symptoms leading to the original diagnosis and decision to start medication therapy are better.     Appetite is stable.  Sleeping patterns are stable.  No reported thoughts of suicide or homicide.  Last 3 PHQ9 results:  PHQ-9 SCORE 2/23/2017 4/7/2017   Total Score 17 9     Problem(s) Oriented visit      ROS:  General and Resp. completed and negative except as noted above     HISTORY:   reports that he does not drink alcohol.   reports that he quit smoking about 15 years ago. His smoking use included Cigarettes. He has never used smokeless tobacco.    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    EXAM:  APPEARANCE: = Relaxed and in no distress      Assessment/Plan:  Nhan was seen today for recheck medication.    Diagnoses and all orders for this visit:    Moderate single current episode of major depressive disorder (H)      COUNSELING:   reports that he quit smoking about 15 years ago. His smoking use included Cigarettes. He has never used smokeless tobacco.    Estimated body mass index is 23.08 kg/(m^2) as calculated from the following:    Height as of 2/14/17: 1.676 m (5' 6\").    Weight as of this encounter: 64.9 kg (143 lb).       Appropriate preventive services were discussed with this patient, including applicable screening as appropriate for cardiovascular disease, diabetes, osteopenia/osteoporosis, " and glaucoma.  As appropriate for age/gender, discussed screening for colorectal cancer, prostate cancer, breast cancer, and cervical cancer. Checklist reviewing preventive services available has been given to the patient.    Reviewed patients plan of care and provided an AVS. The Basic Care Plan (routine screening as documented in Health Maintenance) for Nhan meets the Care Plan requirement. This Care Plan has been established and reviewed with the  Patient.      The following health maintenance items are reviewed in Epic and correct as of today:  Health Maintenance   Topic Date Due     INFLUENZA VACCINE (SYSTEM ASSIGNED)  09/01/2017     COLONOSCOPY Q3 YR INBASKET MESSAGE  04/10/2018     LIPID SCREEN Q5 YR MALE (SYSTEM ASSIGNED)  05/09/2021     TETANUS IMMUNIZATION (SYSTEM ASSIGNED)  05/26/2026       Claudy Juarez  [facility]  For any issues my office # is [phone]

## 2017-04-07 NOTE — MR AVS SNAPSHOT
After Visit Summary   4/7/2017    Nhan Bryan    MRN: [id number]           Patient Information     Date Of Birth          1970        Visit Information        Provider Department      4/7/2017 11:45 AM Claudy Juarez MD [facility]        Today's Diagnoses     Moderate single current episode of major depressive disorder (H)           Follow-ups after your visit        Who to contact     If you have questions or need follow up information about today's clinic visit or your schedule please contact [facility] directly at [phone].  Normal or non-critical lab and imaging results will be communicated to you by INAPPINhart, letter or phone within 4 business days after the clinic has received the results. If you do not hear from us within 7 days, please contact the clinic through [employer] or phone. If you have a critical or abnormal lab result, we will notify you by phone as soon as possible.  Submit refill requests through TopChalks or call your pharmacy and they will forward the refill request to us. Please allow 3 business days for your refill to be completed.          Additional Information About Your Visit        MyChart Information     TopChalks gives you secure access to your electronic health record. If you see a primary care provider, you can also send messages to your care team and make appointments. If you have questions, please call your primary care clinic.  If you do not have a primary care provider, please call [phone] and they will assist you.        Care EveryWhere ID     This is your Care EveryWhere ID. This could be used by other organizations to access your [facility] medical records  [id number]        Your Vitals Were     Pulse Pulse Oximetry BMI (Body Mass Index)             81 99% 23.08 kg/m2          Blood Pressure from Last 3 Encounters:   04/07/17 118/78   02/23/17 128/78   02/14/17 124/84    Weight from Last 3 Encounters:   04/07/17 64.9  kg (143 lb)   02/23/17 64.4 kg (142 lb)   02/14/17 63.5 kg (140 lb)              Today, you had the following     No orders found for display         Today's Medication Changes          These changes are accurate as of: 4/7/17 12:27 PM.  If you have any questions, ask your nurse or doctor.               These medicines have changed or have updated prescriptions.        Dose/Directions    sertraline 100 MG tablet   Commonly known as:  ZOLOFT   This may have changed:    - medication strength  - how much to take   Used for:  Moderate single current episode of major depressive disorder (H)   Changed by:  Claudy Juarez MD        Dose:  100 mg   Take 1 tablet (100 mg) by mouth daily Start with a half pill for the first week.   Quantity:  30 tablet   Refills:  3            Where to get your medicines      These medications were sent to [facility] [address] [place] [address] [address] AT [facility]  [address], [facility] [address]    Hours:  24-hours Phone:  [phone]     sertraline 100 MG tablet                Primary Care Provider Office Phone # Fax #    Claudy Juarez [phone]       [facility] [address]  [facility] [address]        Thank you!     Thank you for choosing [facility]  for your care. Our goal is always to provide you with excellent care. Hearing back from our patients is one way we can continue to improve our services. Please take a few minutes to complete the written survey that you may receive in the mail after your visit with us. Thank you!             Your Updated Medication List - Protect others around you: Learn how to safely use, store and throw away your medicines at www.disposemymeds.org.          This list is accurate as of: 4/7/17 12:27 PM.  Always use your most recent med list.                   Brand Name Dispense Instructions for use    * levETIRAcetam 500 MG tablet    KEPPRA    60  tablet    Take 1 tablet (500 mg) by mouth At Bedtime       * levETIRAcetam 750 MG tablet    KEPPRA     Take 2 tablets (1,500 mg) by mouth 2 times daily       lisinopril 20 MG tablet    PRINIVIL/ZESTRIL    30 tablet    Take 1 tablet (20 mg) by mouth daily       sertraline 100 MG tablet    ZOLOFT    30 tablet    Take 1 tablet (100 mg) by mouth daily Start with a half pill for the first week.       * Notice:  This list has 2 medication(s) that are the same as other medications prescribed for you. Read the directions carefully, and ask your doctor or other care provider to review them with you.

## 2017-04-08 ASSESSMENT — PATIENT HEALTH QUESTIONNAIRE - PHQ9: SUM OF ALL RESPONSES TO PHQ QUESTIONS 1-9: 9

## 2017-05-31 DIAGNOSIS — R07.89 ATYPICAL CHEST PAIN: ICD-10-CM

## 2017-05-31 DIAGNOSIS — I10 BENIGN ESSENTIAL HYPERTENSION: ICD-10-CM

## 2017-06-01 RX ORDER — LISINOPRIL 20 MG/1
TABLET ORAL
Qty: 30 TABLET | Refills: 0 | Status: SHIPPED | OUTPATIENT
Start: 2017-06-01 | End: 2017-06-28

## 2017-06-01 NOTE — TELEPHONE ENCOUNTER
lisinopril last OV 4/7/17 last comp 2/14/17  Kristie Farley MA June 1, 2017 9:59 AM    Last Basic Metabolic Panel:  Lab Results   Component Value Date     02/14/2017      Lab Results   Component Value Date    POTASSIUM 3.7 02/14/2017     Lab Results   Component Value Date    CHLORIDE 99 02/14/2017     Lab Results   Component Value Date    BRANDON 8.5 02/14/2017     Lab Results   Component Value Date    CO2 30 02/14/2017     Lab Results   Component Value Date    BUN 10 02/14/2017     Lab Results   Component Value Date    CR 0.84 02/14/2017     Lab Results   Component Value Date     02/14/2017

## 2017-06-19 ENCOUNTER — OFFICE VISIT (OUTPATIENT)
Dept: FAMILY MEDICINE | Facility: CLINIC | Age: 47
End: 2017-06-19

## 2017-06-19 VITALS
DIASTOLIC BLOOD PRESSURE: 78 MMHG | BODY MASS INDEX: 23.37 KG/M2 | RESPIRATION RATE: 16 BRPM | OXYGEN SATURATION: 98 % | HEART RATE: 77 BPM | SYSTOLIC BLOOD PRESSURE: 116 MMHG | HEIGHT: 66 IN | WEIGHT: 145.4 LBS

## 2017-06-19 DIAGNOSIS — F33.41 RECURRENT MAJOR DEPRESSIVE DISORDER, IN PARTIAL REMISSION (H): Primary | ICD-10-CM

## 2017-06-19 PROCEDURE — 99213 OFFICE O/P EST LOW 20 MIN: CPT | Performed by: FAMILY MEDICINE

## 2017-06-19 ASSESSMENT — ANXIETY QUESTIONNAIRES
6. BECOMING EASILY ANNOYED OR IRRITABLE: MORE THAN HALF THE DAYS
3. WORRYING TOO MUCH ABOUT DIFFERENT THINGS: SEVERAL DAYS
1. FEELING NERVOUS, ANXIOUS, OR ON EDGE: MORE THAN HALF THE DAYS
7. FEELING AFRAID AS IF SOMETHING AWFUL MIGHT HAPPEN: NOT AT ALL
5. BEING SO RESTLESS THAT IT IS HARD TO SIT STILL: SEVERAL DAYS
2. NOT BEING ABLE TO STOP OR CONTROL WORRYING: SEVERAL DAYS
GAD7 TOTAL SCORE: 8
IF YOU CHECKED OFF ANY PROBLEMS ON THIS QUESTIONNAIRE, HOW DIFFICULT HAVE THESE PROBLEMS MADE IT FOR YOU TO DO YOUR WORK, TAKE CARE OF THINGS AT HOME, OR GET ALONG WITH OTHER PEOPLE: SOMEWHAT DIFFICULT

## 2017-06-19 ASSESSMENT — PATIENT HEALTH QUESTIONNAIRE - PHQ9: 5. POOR APPETITE OR OVEREATING: SEVERAL DAYS

## 2017-06-19 NOTE — MR AVS SNAPSHOT
"              After Visit Summary   6/19/2017    Nhan Bryan    MRN: [id number]           Patient Information     Date Of Birth          1970        Visit Information        Provider Department      6/19/2017 11:45 AM Claudy Juarez MD [facility]        Today's Diagnoses     Recurrent major depressive disorder, in partial remission (H)    -  1       Follow-ups after your visit        Who to contact     If you have questions or need follow up information about today's clinic visit or your schedule please contact [facility] directly at [phone].  Normal or non-critical lab and imaging results will be communicated to you by Peerbyhart, letter or phone within 4 business days after the clinic has received the results. If you do not hear from us within 7 days, please contact the clinic through Lokut or phone. If you have a critical or abnormal lab result, we will notify you by phone as soon as possible.  Submit refill requests through Sistemic or call your pharmacy and they will forward the refill request to us. Please allow 3 business days for your refill to be completed.          Additional Information About Your Visit        MyChart Information     Sistemic gives you secure access to your electronic health record. If you see a primary care provider, you can also send messages to your care team and make appointments. If you have questions, please call your primary care clinic.  If you do not have a primary care provider, please call [phone] and they will assist you.        Care EveryWhere ID     This is your Care EveryWhere ID. This could be used by other organizations to access your [facility] medical records  [id number]        Your Vitals Were     Pulse Respirations Height Pulse Oximetry BMI (Body Mass Index)       77 16 1.676 m (5' 6\") 98% 23.47 kg/m2        Blood Pressure from Last 3 Encounters:   06/19/17 116/78   04/07/17 118/78   02/23/17 128/78    Weight from " Last 3 Encounters:   06/19/17 66 kg (145 lb 6.4 oz)   04/07/17 64.9 kg (143 lb)   02/23/17 64.4 kg (142 lb)              Today, you had the following     No orders found for display       Primary Care Provider Office Phone # Fax #    Claudy Juarez [phone]       [facility] [address]  [facility] [address]        Thank you!     Thank you for choosing [facility]  for your care. Our goal is always to provide you with excellent care. Hearing back from our patients is one way we can continue to improve our services. Please take a few minutes to complete the written survey that you may receive in the mail after your visit with us. Thank you!             Your Updated Medication List - Protect others around you: Learn how to safely use, store and throw away your medicines at www.disposemymeds.org.          This list is accurate as of: 6/19/17 12:35 PM.  Always use your most recent med list.                   Brand Name Dispense Instructions for use    * levETIRAcetam 500 MG tablet    KEPPRA    60 tablet    Take 1 tablet (500 mg) by mouth At Bedtime       * levETIRAcetam 750 MG tablet    KEPPRA     Take 2 tablets (1,500 mg) by mouth 2 times daily       lisinopril 20 MG tablet    PRINIVIL/ZESTRIL    30 tablet    TAKE 1 TABLET(20 MG) BY MOUTH DAILY       sertraline 100 MG tablet    ZOLOFT    30 tablet    Take 1 tablet (100 mg) by mouth daily Start with a half pill for the first week.       * Notice:  This list has 2 medication(s) that are the same as other medications prescribed for you. Read the directions carefully, and ask your doctor or other care provider to review them with you.

## 2017-06-19 NOTE — PROGRESS NOTES
Depression:  Has known history of depression.  Has been on medication for this.  The patient does not report any significant side effects of this medication.  The prior symptoms leading to the original diagnosis and decision to start medication therapy are better.     Appetite is stable.  Sleeping patterns are stable.  No reported thoughts of suicide or homicide.  Last PHQ-9 score on record=   PHQ-9 SCORE 2/23/2017 4/7/2017 6/19/2017   Total Score 17 9 3     Much improved, wants to continue the same.  Assessment/Plan:  Nhan was seen today for hypertension and depression.    Diagnoses and all orders for this visit:    Recurrent major depressive disorder, in partial remission (H)    recheck in 3 months    Claudy Juarez MD  [facility]  [phone]

## 2017-06-20 ASSESSMENT — PATIENT HEALTH QUESTIONNAIRE - PHQ9: SUM OF ALL RESPONSES TO PHQ QUESTIONS 1-9: 3

## 2017-06-20 ASSESSMENT — ANXIETY QUESTIONNAIRES: GAD7 TOTAL SCORE: 8

## 2017-06-28 DIAGNOSIS — R07.89 ATYPICAL CHEST PAIN: ICD-10-CM

## 2017-06-28 DIAGNOSIS — I10 BENIGN ESSENTIAL HYPERTENSION: ICD-10-CM

## 2017-06-28 DIAGNOSIS — Z76.0 ENCOUNTER FOR MEDICATION REFILL: Primary | ICD-10-CM

## 2017-06-28 NOTE — TELEPHONE ENCOUNTER
Lisinopril, 20 mg, qd. Last OV 6/19/2017.   Last TSH: 2/14/2017  Last CBC: 2/14/2017  Last CMP: 2/14/2017  Lipid: 5/11/2016

## 2017-06-29 RX ORDER — LISINOPRIL 20 MG/1
TABLET ORAL
Qty: 30 TABLET | Refills: 0 | Status: SHIPPED | OUTPATIENT
Start: 2017-06-29 | End: 2017-08-06

## 2017-07-12 ENCOUNTER — OFFICE VISIT (OUTPATIENT)
Dept: URGENT CARE | Facility: URGENT CARE | Age: 47
End: 2017-07-12

## 2017-07-12 VITALS
DIASTOLIC BLOOD PRESSURE: 81 MMHG | TEMPERATURE: 96.9 F | WEIGHT: 143.1 LBS | BODY MASS INDEX: 23.1 KG/M2 | HEART RATE: 75 BPM | SYSTOLIC BLOOD PRESSURE: 114 MMHG

## 2017-07-12 DIAGNOSIS — T63.461A WASP STING, ACCIDENTAL OR UNINTENTIONAL, INITIAL ENCOUNTER: Primary | ICD-10-CM

## 2017-07-12 DIAGNOSIS — L03.113 CELLULITIS OF HAND, RIGHT: ICD-10-CM

## 2017-07-12 PROCEDURE — 99213 OFFICE O/P EST LOW 20 MIN: CPT | Performed by: FAMILY MEDICINE

## 2017-07-12 RX ORDER — CEPHALEXIN 500 MG/1
500 CAPSULE ORAL 3 TIMES DAILY
Qty: 21 CAPSULE | Refills: 0 | Status: SHIPPED | OUTPATIENT
Start: 2017-07-12 | End: 2017-07-19

## 2017-07-12 NOTE — PATIENT INSTRUCTIONS
Place ice onto the right hand for 20 minutes at a times, every 3-4 hours while awake.     Ibuprofen, Tylenol for pain.      follow up if any fevers or spreading redness appears.

## 2017-07-12 NOTE — LETTER
[facility]  [address]  [address]  [facility] [place] [facility] [address]  [phone]        2017    REPORT OF WORK ABILITY    PATIENT DATA  Employee Name: Nhan Bryan        : 1970   [id number]  Work related injury: YES  Today's date: 2017  Date of injury: 7/10/2017    PROVIDER EVALUATION: Please fill in as needed.  Please give copy to employee for employer.  1. Diagnosis: Wasp Sting on the Right Hand, Cellulitis of the Right Hand.   2. Treatment: Ice onto the painful areas of the right hand.   3. Medication: Cephalexin 500 mg capsules.  Take one capsule by mouth three times a day for seven days.   NOTE: When ordering a medication, [place] Rules require Work Comp or WC on prescriptions.  4. Return to work date: 2017, with no work limitations.       RESTRICTIONS: Unlimited unless listed.  Restrictions apply to home and leisure also.  If work within restrictions is not available, the employee is totally disabled.  Provider comments: follow up if there is spreading redness or fevers.    Medical Examiner: Romain Horvath MD      License or registration: [place] [id number]    Next appointment: follow up as needed if there is spreading redness or fevers.      CC: Employer, Managed Care Plan/Payor, Patient

## 2017-07-12 NOTE — MR AVS SNAPSHOT
After Visit Summary   7/12/2017    Nhan Bryan    MRN: [id number]           Patient Information     Date Of Birth          1970        Visit Information        Provider Department      7/12/2017 2:10 PM Romain Horvath MD [facility]        Today's Diagnoses     Wasp sting, accidental or unintentional, initial encounter    -  1    Cellulitis of hand, right          Care Instructions    Place ice onto the right hand for 20 minutes at a times, every 3-4 hours while awake.     Ibuprofen, Tylenol for pain.      follow up if any fevers or spreading redness appears.                Follow-ups after your visit        Who to contact     If you have questions or need follow up information about today's clinic visit or your schedule please contact [facility] directly at [phone].  Normal or non-critical lab and imaging results will be communicated to you by MyChart, letter or phone within 4 business days after the clinic has received the results. If you do not hear from us within 7 days, please contact the clinic through MyChart or phone. If you have a critical or abnormal lab result, we will notify you by phone as soon as possible.  Submit refill requests through [employer] or call your pharmacy and they will forward the refill request to us. Please allow 3 business days for your refill to be completed.          Additional Information About Your Visit        MyChart Information     [employer] gives you secure access to your electronic health record. If you see a primary care provider, you can also send messages to your care team and make appointments. If you have questions, please call your primary care clinic.  If you do not have a primary care provider, please call [phone] and they will assist you.        Care EveryWhere ID     This is your Care EveryWhere ID. This could be used by other organizations to access your [facility] medical records  [id number]         Your Vitals Were     Pulse Temperature BMI (Body Mass Index)             75 96.9  F (36.1  C) (Tympanic) 23.1 kg/m2          Blood Pressure from Last 3 Encounters:   07/12/17 114/81   06/19/17 116/78   04/07/17 118/78    Weight from Last 3 Encounters:   07/12/17 143 lb 1.6 oz (64.9 kg)   06/19/17 145 lb 6.4 oz (66 kg)   04/07/17 143 lb (64.9 kg)              Today, you had the following     No orders found for display         Today's Medication Changes          These changes are accurate as of: 7/12/17  3:33 PM.  If you have any questions, ask your nurse or doctor.               Start taking these medicines.        Dose/Directions    cephALEXin 500 MG capsule   Commonly known as:  KEFLEX   Used for:  Cellulitis of hand, right   Started by:  Romain Horvath MD        Dose:  500 mg   Take 1 capsule (500 mg) by mouth 3 times daily for 7 days   Quantity:  21 capsule   Refills:  0            Where to get your medicines      These medications were sent to [facility] [address] [place] [address] [address] AT [facility] [address]  [address], [facility] [address]    Hours:  24-hours Phone:  [phone]     cephALEXin 500 MG capsule                Primary Care Provider Office Phone # Fax #    Claudy Juarez [phone]       [facility] [address]  [facility] [address]        Equal Access to Services     [facility] GILMAR AH: Hadii aad ku hadasho Soomaali, waaxda luqadaha, qaybta kaalmada adeegyada, waxay fareed hayson dallas. So [facility] [phone].    ATENCIÓN: Si habla español, tiene a gould disposición servicios gratuitos de asistencia lingüística. Llame al [phone].    We comply with applicable federal civil rights laws and [place] laws. We do not discriminate on the basis of race, color, national origin, age, disability sex, sexual orientation or gender identity.            Thank you!     Thank you for choosing [facility]   for your care. Our goal is always to provide you with excellent care. Hearing back from our patients is one way we can continue to improve our services. Please take a few minutes to complete the written survey that you may receive in the mail after your visit with us. Thank you!             Your Updated Medication List - Protect others around you: Learn how to safely use, store and throw away your medicines at www.disposemymeds.org.          This list is accurate as of: 7/12/17  3:33 PM.  Always use your most recent med list.                   Brand Name Dispense Instructions for use Diagnosis    cephALEXin 500 MG capsule    KEFLEX    21 capsule    Take 1 capsule (500 mg) by mouth 3 times daily for 7 days    Cellulitis of hand, right       * levETIRAcetam 500 MG tablet    KEPPRA    60 tablet    Take 1 tablet (500 mg) by mouth At Bedtime        * levETIRAcetam 750 MG tablet    KEPPRA     Take 2 tablets (1,500 mg) by mouth 2 times daily        lisinopril 20 MG tablet    PRINIVIL/ZESTRIL    30 tablet    TAKE 1 TABLET(20 MG) BY MOUTH DAILY    Encounter for medication refill       sertraline 100 MG tablet    ZOLOFT    30 tablet    Take 1 tablet (100 mg) by mouth daily Start with a half pill for the first week.    Moderate single current episode of major depressive disorder (H)       * Notice:  This list has 2 medication(s) that are the same as other medications prescribed for you. Read the directions carefully, and ask your doctor or other care provider to review them with you.

## 2017-07-12 NOTE — PROGRESS NOTES
THIS IS A WORK COMP ENCOUNTER    SUBJECTIVE:   Nhan Bryan is a 46 year old male presenting with a chief complaint of being stung twice by wasps on the right hand and once on the right elbow while at work. Onset of symptoms was two days ago.  At first, there were welts. Next, there was stiffness due to the swelling.  Yesterday night, the lesion has been very itchy.  No pain.  No fevers/red streaks.  No wheezing/breathing problems.  No throat tightening.   Course of illness is still present. .   Current and Associated symptoms: as listed above.   Treatment measures tried include Alcohol swabs and a lotion. ..    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 30 tablet 3     levETIRAcetam (KEPPRA) 500 MG tablet Take 1 tablet (500 mg) by mouth At Bedtime 60 tablet      levETIRAcetam (KEPPRA) 750 MG tablet Take 2 tablets (1,500 mg) by mouth 2 times daily       Social History   Substance Use Topics     Smoking status: Former Smoker     Types: Cigarettes     Quit date: 1/1/2002     Smokeless tobacco: Never Used     Alcohol use No      Comment: quit 2002       ROS:  Review of systems negative except as stated above.    OBJECTIVE  :/81  Pulse 75  Temp 96.9  F (36.1  C) (Tympanic)  Wt 143 lb 1.6 oz (64.9 kg)  BMI 23.1 kg/m2  GENERAL APPEARANCE: healthy, alert and no distress  Extremities: Right hand has confluent erythema, warmth, edema at the area corresponding to the dorsum overlying the first and second metacarpal bones and overlying the first web space.      ASSESSMENT:  Wasp bites on the right hand  Possible Cellulitis of the right hand.     PLAN:  Rx:  Cephalexin  Ice  Ibuprofen, Tylenol for pain  follow up if any fevers/spreading redness appears.   See orders in Epic    Romain Horvath MD

## 2017-07-12 NOTE — NURSING NOTE
"Chief Complaint   Patient presents with     Urgent Care     Work Comp     Insect Bite     Stung on Monday while at work, righrt hand swelling, told to see doctor.       Initial /81  Pulse 75  Temp 96.9  F (36.1  C) (Tympanic)  Wt 143 lb 1.6 oz (64.9 kg)  BMI 23.1 kg/m2 Estimated body mass index is 23.1 kg/(m^2) as calculated from the following:    Height as of 6/19/17: 5' 6\" (1.676 m).    Weight as of this encounter: 143 lb 1.6 oz (64.9 kg).  Medication Reconciliation: complete    "

## 2017-08-06 DIAGNOSIS — Z76.0 ENCOUNTER FOR MEDICATION REFILL: ICD-10-CM

## 2017-08-07 RX ORDER — LISINOPRIL 20 MG/1
TABLET ORAL
Qty: 30 TABLET | Refills: 0 | Status: SHIPPED | OUTPATIENT
Start: 2017-08-07 | End: 2017-09-04

## 2017-08-07 NOTE — TELEPHONE ENCOUNTER
lisinopril (PRINIVIL/ZESTRIL) 20 MG tablet; last OV: 6/19/2017    Component      Latest Ref Rng & Units 2/14/2017   Sodium      133 - 144 mmol/L 136   Potassium      3.4 - 5.3 mmol/L 3.7   Chloride      94 - 109 mmol/L 99   Carbon Dioxide      20 - 32 mmol/L 30   Anion Gap      3 - 14 mmol/L 7   Glucose      70 - 99 mg/dL 106 (H)   Urea Nitrogen      7 - 30 mg/dL 10   Creatinine      0.66 - 1.25 mg/dL 0.84   GFR Estimate      >60 mL/min/1.7m2 >90 . . .   GFR Estimate If Black      >60 mL/min/1.7m2 >90 . . .   Calcium      8.5 - 10.1 mg/dL 8.5   Bilirubin Total      0.2 - 1.3 mg/dL 0.7   Albumin      3.4 - 5.0 g/dL 4.0   Protein Total      6.8 - 8.8 g/dL 7.4   Alkaline Phosphatase      40 - 150 U/L 69   ALT      0 - 70 U/L 16   AST      0 - 45 U/L 10

## 2017-08-28 ENCOUNTER — OFFICE VISIT (OUTPATIENT)
Dept: FAMILY MEDICINE | Facility: CLINIC | Age: 47
End: 2017-08-28

## 2017-08-28 VITALS
DIASTOLIC BLOOD PRESSURE: 80 MMHG | HEART RATE: 71 BPM | SYSTOLIC BLOOD PRESSURE: 115 MMHG | HEIGHT: 65 IN | WEIGHT: 144 LBS | OXYGEN SATURATION: 98 % | BODY MASS INDEX: 23.99 KG/M2

## 2017-08-28 DIAGNOSIS — F32.1 MODERATE SINGLE CURRENT EPISODE OF MAJOR DEPRESSIVE DISORDER (H): ICD-10-CM

## 2017-08-28 PROCEDURE — 99213 OFFICE O/P EST LOW 20 MIN: CPT | Performed by: FAMILY MEDICINE

## 2017-08-28 RX ORDER — SERTRALINE HYDROCHLORIDE 100 MG/1
100 TABLET, FILM COATED ORAL DAILY
Qty: 90 TABLET | Refills: 3 | Status: SHIPPED | OUTPATIENT
Start: 2017-08-28 | End: 2018-08-09

## 2017-08-28 ASSESSMENT — PATIENT HEALTH QUESTIONNAIRE - PHQ9: SUM OF ALL RESPONSES TO PHQ QUESTIONS 1-9: 11

## 2017-08-28 NOTE — MR AVS SNAPSHOT
"              After Visit Summary   8/28/2017    Nhan Bryan    MRN: [id number]           Patient Information     Date Of Birth          1970        Visit Information        Provider Department      8/28/2017 10:00 AM Claudy Juarez MD [facility]        Today's Diagnoses     Moderate single current episode of major depressive disorder (H)           Follow-ups after your visit        Who to contact     If you have questions or need follow up information about today's clinic visit or your schedule please contact [facility] directly at [phone].  Normal or non-critical lab and imaging results will be communicated to you by LoiLohart, letter or phone within 4 business days after the clinic has received the results. If you do not hear from us within 7 days, please contact the clinic through Lonely Sockt or phone. If you have a critical or abnormal lab result, we will notify you by phone as soon as possible.  Submit refill requests through [employer] or call your pharmacy and they will forward the refill request to us. Please allow 3 business days for your refill to be completed.          Additional Information About Your Visit        MyChart Information     [employer] gives you secure access to your electronic health record. If you see a primary care provider, you can also send messages to your care team and make appointments. If you have questions, please call your primary care clinic.  If you do not have a primary care provider, please call [phone] and they will assist you.        Care EveryWhere ID     This is your Care EveryWhere ID. This could be used by other organizations to access your [place] medical records  [id number]        Your Vitals Were     Pulse Height Pulse Oximetry BMI (Body Mass Index)          71 1.657 m (5' 5.25\") 98% 23.78 kg/m2         Blood Pressure from Last 3 Encounters:   08/28/17 115/80   07/12/17 114/81   06/19/17 116/78    Weight from Last 3 " Encounters:   08/28/17 65.3 kg (144 lb)   07/12/17 64.9 kg (143 lb 1.6 oz)   06/19/17 66 kg (145 lb 6.4 oz)              Today, you had the following     No orders found for display         Where to get your medicines      These medications were sent to [id number] - [place], [place] - [address] AT [facility] [address]  [address], [facility] [address]    Hours:  24-hours Phone:  [phone]     sertraline 100 MG tablet          Primary Care Provider Office Phone # Fax #    Claudy Juarez [phone] [address]  [facility] [address]        Equal Access to Services     VIOLETA SCHAFER : Hadii aad ku hadasho Soomaali, waaxda luqadaha, qaybta kaalmada adeegyada, waxay fareed hayson mendez . So [facility] [phone].    ATENCIÓN: Si habla español, tiene a gould disposición servicios gratuitos de asistencia lingüística. Nathalie al [phone].    We comply with applicable federal civil rights laws and [place] laws. We do not discriminate on the basis of race, color, national origin, age, disability sex, sexual orientation or gender identity.            Thank you!     Thank you for choosing [facility]  for your care. Our goal is always to provide you with excellent care. Hearing back from our patients is one way we can continue to improve our services. Please take a few minutes to complete the written survey that you may receive in the mail after your visit with us. Thank you!             Your Updated Medication List - Protect others around you: Learn how to safely use, store and throw away your medicines at www.disposemymeds.org.          This list is accurate as of: 8/28/17 10:36 AM.  Always use your most recent med list.                   Brand Name Dispense Instructions for use Diagnosis    * levETIRAcetam 500 MG tablet    KEPPRA    60 tablet    Take 1 tablet (500 mg) by mouth At Bedtime        * levETIRAcetam 750 MG tablet     KEPPRA     Take 2 tablets (1,500 mg) by mouth 2 times daily        lisinopril 20 MG tablet    PRINIVIL/ZESTRIL    30 tablet    TAKE 1 TABLET(20 MG) BY MOUTH DAILY    Encounter for medication refill       sertraline 100 MG tablet    ZOLOFT    90 tablet    Take 1 tablet (100 mg) by mouth daily Start with a half pill for the first week.    Moderate single current episode of major depressive disorder (H)       * Notice:  This list has 2 medication(s) that are the same as other medications prescribed for you. Read the directions carefully, and ask your doctor or other care provider to review them with you.

## 2017-08-28 NOTE — PROGRESS NOTES
Depression:  Has known history of depression.  Has been on medication for this.  The patient does not report any significant side effects of this medication.  The prior symptoms leading to the original diagnosis and decision to start medication therapy are better.     Appetite is stable.  Sleeping patterns are stable.  No reported thoughts of suicide or homicide.  Last 3 PHQ9 results:  PHQ-9 SCORE 2/23/2017 4/7/2017 6/19/2017 8/28/2017   Total Score 17 9 3 11     Had a bad month due to stress from Brother's sexual issues.    Work has been quite stressful. Favorite manager retired.    Assessment/Plan:  Nhan was seen today for depression and recheck medication.    Diagnoses and all orders for this visit:    Moderate single current episode of major depressive disorder (H)  -     sertraline (ZOLOFT) 100 MG tablet; Take 1 tablet (100 mg) by mouth daily Start with a half pill for the first week.      Claudy Juarez MD  [facility]  [phone]

## 2017-12-07 ENCOUNTER — OFFICE VISIT (OUTPATIENT)
Dept: FAMILY MEDICINE | Facility: CLINIC | Age: 47
End: 2017-12-07

## 2017-12-07 VITALS
DIASTOLIC BLOOD PRESSURE: 80 MMHG | WEIGHT: 143 LBS | BODY MASS INDEX: 23.61 KG/M2 | SYSTOLIC BLOOD PRESSURE: 122 MMHG | OXYGEN SATURATION: 97 % | HEART RATE: 74 BPM

## 2017-12-07 DIAGNOSIS — F33.42 RECURRENT MAJOR DEPRESSIVE DISORDER, IN FULL REMISSION (H): ICD-10-CM

## 2017-12-07 DIAGNOSIS — Z23 NEED FOR PROPHYLACTIC VACCINATION AND INOCULATION AGAINST INFLUENZA: Primary | ICD-10-CM

## 2017-12-07 PROCEDURE — 90471 IMMUNIZATION ADMIN: CPT | Performed by: FAMILY MEDICINE

## 2017-12-07 PROCEDURE — 90686 IIV4 VACC NO PRSV 0.5 ML IM: CPT | Performed by: FAMILY MEDICINE

## 2017-12-07 PROCEDURE — 99213 OFFICE O/P EST LOW 20 MIN: CPT | Mod: 25 | Performed by: FAMILY MEDICINE

## 2017-12-07 ASSESSMENT — ANXIETY QUESTIONNAIRES
7. FEELING AFRAID AS IF SOMETHING AWFUL MIGHT HAPPEN: NOT AT ALL
2. NOT BEING ABLE TO STOP OR CONTROL WORRYING: NOT AT ALL
IF YOU CHECKED OFF ANY PROBLEMS ON THIS QUESTIONNAIRE, HOW DIFFICULT HAVE THESE PROBLEMS MADE IT FOR YOU TO DO YOUR WORK, TAKE CARE OF THINGS AT HOME, OR GET ALONG WITH OTHER PEOPLE: SOMEWHAT DIFFICULT
5. BEING SO RESTLESS THAT IT IS HARD TO SIT STILL: NEARLY EVERY DAY
1. FEELING NERVOUS, ANXIOUS, OR ON EDGE: SEVERAL DAYS
3. WORRYING TOO MUCH ABOUT DIFFERENT THINGS: SEVERAL DAYS
GAD7 TOTAL SCORE: 6
6. BECOMING EASILY ANNOYED OR IRRITABLE: SEVERAL DAYS

## 2017-12-07 ASSESSMENT — PATIENT HEALTH QUESTIONNAIRE - PHQ9
SUM OF ALL RESPONSES TO PHQ QUESTIONS 1-9: 2
5. POOR APPETITE OR OVEREATING: NOT AT ALL

## 2017-12-07 NOTE — PATIENT INSTRUCTIONS
"Thanks for coming in to see me today!    Your next visit with us should be scheduled for Follow up in 6 months.    Listed next are the medical health Maintenance items we are tracking for your care and when they are next due (or overdue!)  Our goal is 100% \"up to date.\" Keep this list handy to call and schedule what you are due for in the future!    Health Maintenance Due   Topic Date Due     DEPRESSION ACTION PLAN Q1 YR  12/31/1986     INFLUENZA VACCINE (SYSTEM ASSIGNED)           "

## 2017-12-07 NOTE — PROGRESS NOTES
Injectable Influenza Immunization Documentation    1.  Is the person to be vaccinated sick today?   No    2. Does the person to be vaccinated have an allergy to a component   of the vaccine?   No  Egg Allergy Algorithm Link    3. Has the person to be vaccinated ever had a serious reaction   to influenza vaccine in the past?   No    4. Has the person to be vaccinated ever had Guillain-Barré syndrome?   No    Form completed by KRYS    Depression:  Has known history of depression.  Has been on medication for this.  The patient does not report any significant side effects of this medication.  The prior symptoms leading to the original diagnosis and decision to start medication therapy are better.     Appetite is stable.  Sleeping patterns are stable.  No reported thoughts of suicide or homicide.  Last PHQ-9 score on record=   PHQ-9 SCORE 4/7/2017 6/19/2017 8/28/2017 12/7/2017   Total Score 9 3 11 2     >25 min spent with patient, greater than 50% spent on discussion/education/planning, etc. About The primary encounter diagnosis was Need for prophylactic vaccination and inoculation against influenza. A diagnosis of Recurrent major depressive disorder, in full remission (H) was also pertinent to this visit.    Problem(s) Oriented visit      ROS:  General and Resp. completed and negative except as noted above     HISTORY:   reports that he does not drink alcohol.   reports that he quit smoking about 15 years ago. His smoking use included Cigarettes. He has never used smokeless tobacco.    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    EXAM:  APPEARANCE: = Relaxed and in no distress      Assessment/Plan:  Nhan was seen today for recheck medication and flu shot.    Diagnoses and all orders for this visit:    Need for prophylactic vaccination and inoculation against influenza  -     FLU VAC, SPLIT VIRUS IM > 3 YO (QUADRIVALENT) [13674]  -     Vaccine Administration, Initial [55385]    Recurrent major depressive disorder, in full remission (H)        COUNSELING:   reports that he quit smoking about 15 years ago. His smoking use included Cigarettes. He has never used smokeless tobacco.    Estimated body mass index is 23.61 kg/(m^2) as calculated from the following:    Height as of 8/28/17: 1.657 m (5' 5.25\").    Weight as of this encounter: 64.9 kg (143 lb).         Appropriate preventive services were discussed with this patient, including applicable screening as appropriate for cardiovascular disease, diabetes, osteopenia/osteoporosis, and glaucoma.  As appropriate for age/gender, discussed screening for colorectal cancer, prostate cancer, breast cancer, and cervical cancer. Checklist reviewing preventive services available has been given to the patient.    Reviewed patients plan of care and provided an AVS. The Basic Care Plan (routine screening as documented in Health Maintenance) for Nhan meets the Care Plan requirement. This Care Plan has been established and reviewed with the  Patient.      The following health maintenance items are reviewed in Epic and correct as of today:Health Maintenance   Topic Date Due     DEPRESSION ACTION PLAN Q1 YR  12/31/1986     INFLUENZA VACCINE (SYSTEM ASSIGNED)  09/01/2017     PHQ-9 Q6 MONTHS  02/28/2018     COLONOSCOPY Q3 YR  04/10/2018     LIPID SCREEN Q5 YR MALE (SYSTEM ASSIGNED)  05/09/2021     TETANUS IMMUNIZATION (SYSTEM ASSIGNED)  05/26/2026       Claudy Juarez  [facility]  For any issues my office # is [phone]  "

## 2017-12-07 NOTE — MR AVS SNAPSHOT
"              After Visit Summary   12/7/2017    Nhan Bryan    MRN: [id number]           Patient Information     Date Of Birth          1970        Visit Information        Provider Department      12/7/2017 3:45 PM Claudy Juarez MD [facility]        Today's Diagnoses     Need for prophylactic vaccination and inoculation against influenza    -  1    Recurrent major depressive disorder, in full remission (H)          Care Instructions    Thanks for coming in to see me today!    Your next visit with us should be scheduled for Follow up in 6 months.    Listed next are the medical health Maintenance items we are tracking for your care and when they are next due (or overdue!)  Our goal is 100% \"up to date.\" Keep this list handy to call and schedule what you are due for in the future!    Health Maintenance Due   Topic Date Due     DEPRESSION ACTION PLAN Q1 YR  12/31/1986     INFLUENZA VACCINE (SYSTEM ASSIGNED)                   Follow-ups after your visit        Who to contact     If you have questions or need follow up information about today's clinic visit or your schedule please contact [facility] directly at [phone].  Normal or non-critical lab and imaging results will be communicated to you by Brainparkhart, letter or phone within 4 business days after the clinic has received the results. If you do not hear from us within 7 days, please contact the clinic through [employer] or phone. If you have a critical or abnormal lab result, we will notify you by phone as soon as possible.  Submit refill requests through [employer] or call your pharmacy and they will forward the refill request to us. Please allow 3 business days for your refill to be completed.          Additional Information About Your Visit        Brainparkhart Information     [employer] gives you secure access to your electronic health record. If you see a primary care provider, you can also send messages to your care team and " make appointments. If you have questions, please call your primary care clinic.  If you do not have a primary care provider, please call [phone] and they will assist you.        Care EveryWhere ID     This is your Care EveryWhere ID. This could be used by other organizations to access your [place] medical records  [id number]        Your Vitals Were     Pulse Pulse Oximetry BMI (Body Mass Index)             74 97% 23.61 kg/m2          Blood Pressure from Last 3 Encounters:   12/07/17 122/80   08/28/17 115/80   07/12/17 114/81    Weight from Last 3 Encounters:   12/07/17 64.9 kg (143 lb)   08/28/17 65.3 kg (144 lb)   07/12/17 64.9 kg (143 lb 1.6 oz)              We Performed the Following     FLU VAC, SPLIT VIRUS IM > 3 YO (QUADRIVALENT) [41109]     Vaccine Administration, Initial [32298]        Primary Care Provider Office Phone # Fax #    Claudy Juarez [phone] [address]  [facility] [address]        Equal Access to Services     VIOLETA [facility] Center[facility] : Hadii aad ku hadasho Soomaali, waaxda luqadaha, qaybta kaalmada adearnoldoyaanna, zuleima mendez . So [facility] [phone].    ATENCIÓN: Si habla español, tiene a gould disposición servicios gratuitos de asistencia lingüística. Marceame al [phone].    We comply with applicable federal civil rights laws and [place] laws. We do not discriminate on the basis of race, color, national origin, age, disability, sex, sexual orientation, or gender identity.            Thank you!     Thank you for choosing [facility]  for your care. Our goal is always to provide you with excellent care. Hearing back from our patients is one way we can continue to improve our services. Please take a few minutes to complete the written survey that you may receive in the mail after your visit with us. Thank you!             Your Updated Medication List - Protect others around you: Learn how to safely use, store and throw away  your medicines at www.disposemymeds.org.          This list is accurate as of: 12/7/17  4:23 PM.  Always use your most recent med list.                   Brand Name Dispense Instructions for use Diagnosis    * levETIRAcetam 500 MG tablet    KEPPRA    60 tablet    Take 1 tablet (500 mg) by mouth At Bedtime        * levETIRAcetam 750 MG tablet    KEPPRA     Take 2 tablets (1,500 mg) by mouth 2 times daily        lisinopril 20 MG tablet    PRINIVIL/ZESTRIL    90 tablet    TAKE 1 TABLET(20 MG) BY MOUTH DAILY    Encounter for medication refill       sertraline 100 MG tablet    ZOLOFT    90 tablet    Take 1 tablet (100 mg) by mouth daily Start with a half pill for the first week.    Moderate single current episode of major depressive disorder (H)       * Notice:  This list has 2 medication(s) that are the same as other medications prescribed for you. Read the directions carefully, and ask your doctor or other care provider to review them with you.

## 2017-12-08 ASSESSMENT — ANXIETY QUESTIONNAIRES: GAD7 TOTAL SCORE: 6

## 2018-03-05 DIAGNOSIS — Z76.0 ENCOUNTER FOR MEDICATION REFILL: ICD-10-CM

## 2018-03-05 NOTE — TELEPHONE ENCOUNTER
lisinopril--last seen 12/7/17 (not related), last related appointment was 6/7/16.  Patient is due for an appointment.

## 2018-03-06 RX ORDER — LISINOPRIL 20 MG/1
TABLET ORAL
Qty: 90 TABLET | Refills: 1 | Status: SHIPPED | OUTPATIENT
Start: 2018-03-06 | End: 2018-08-09

## 2018-04-24 ENCOUNTER — SURGERY (OUTPATIENT)
Age: 48
End: 2018-04-24

## 2018-04-24 ENCOUNTER — HOSPITAL ENCOUNTER (OUTPATIENT)
Facility: CLINIC | Age: 48
Discharge: HOME | End: 2018-04-24
Attending: COLON & RECTAL SURGERY | Admitting: COLON & RECTAL SURGERY
Payer: COMMERCIAL

## 2018-04-24 VITALS
DIASTOLIC BLOOD PRESSURE: 79 MMHG | WEIGHT: 143 LBS | RESPIRATION RATE: 13 BRPM | BODY MASS INDEX: 22.98 KG/M2 | HEIGHT: 66 IN | OXYGEN SATURATION: 94 % | SYSTOLIC BLOOD PRESSURE: 106 MMHG

## 2018-04-24 LAB — COLONOSCOPY: NORMAL

## 2018-04-24 PROCEDURE — G0500 MOD SEDAT ENDO SERVICE >5YRS: HCPCS | Performed by: COLON & RECTAL SURGERY

## 2018-04-24 PROCEDURE — 88305 TISSUE EXAM BY PATHOLOGIST: CPT | Mod: 26 | Performed by: COLON & RECTAL SURGERY

## 2018-04-24 PROCEDURE — 25000128 H RX IP 250 OP 636: Performed by: COLON & RECTAL SURGERY

## 2018-04-24 PROCEDURE — 45385 COLONOSCOPY W/LESION REMOVAL: CPT | Performed by: COLON & RECTAL SURGERY

## 2018-04-24 PROCEDURE — 45380 COLONOSCOPY AND BIOPSY: CPT | Mod: PT,XU | Performed by: COLON & RECTAL SURGERY

## 2018-04-24 PROCEDURE — 88305 TISSUE EXAM BY PATHOLOGIST: CPT | Performed by: COLON & RECTAL SURGERY

## 2018-04-24 RX ORDER — FENTANYL CITRATE 50 UG/ML
INJECTION, SOLUTION INTRAMUSCULAR; INTRAVENOUS PRN
Status: DISCONTINUED | OUTPATIENT
Start: 2018-04-24 | End: 2018-04-24 | Stop reason: HOSPADM

## 2018-04-24 RX ORDER — LIDOCAINE 40 MG/G
CREAM TOPICAL
Status: DISCONTINUED | OUTPATIENT
Start: 2018-04-24 | End: 2018-04-24 | Stop reason: HOSPADM

## 2018-04-24 RX ORDER — ONDANSETRON 2 MG/ML
4 INJECTION INTRAMUSCULAR; INTRAVENOUS
Status: DISCONTINUED | OUTPATIENT
Start: 2018-04-24 | End: 2018-04-24 | Stop reason: HOSPADM

## 2018-04-24 RX ADMIN — FENTANYL CITRATE 100 MCG: 50 INJECTION, SOLUTION INTRAMUSCULAR; INTRAVENOUS at 15:04

## 2018-04-24 RX ADMIN — MIDAZOLAM 1 MG: 1 INJECTION INTRAMUSCULAR; INTRAVENOUS at 15:04

## 2018-04-24 RX ADMIN — MIDAZOLAM 1 MG: 1 INJECTION INTRAMUSCULAR; INTRAVENOUS at 15:05

## 2018-04-24 NOTE — H&P
Pre-Endoscopy History and Physical     Nhan Bryan MRN# [id number]   YOB: 1970 Age: 47 year old     Date of Procedure: 4/24/2018  Primary care provider: Claudy Juarez  Type of Endoscopy: colonoscopy  Reason for Procedure: screening, h/o polyps  Type of Anesthesia Anticipated: Moderate Sedation    HPI:    Nhan is a 47 year old male who will be undergoing the above procedure.      A history and physical has been performed. The patient's medications and allergies have been reviewed. The risks and benefits of the procedure including the risk of bleeding, perforation, and missed lesions as well as the sedation options and risks were discussed with the patient.  All questions were answered and informed consent was obtained.      No Known Allergies     Current Facility-Administered Medications   Medication     lidocaine (LMX4) cream     lidocaine 1 % 1 mL     ondansetron (ZOFRAN) injection 4 mg     sodium chloride (PF) 0.9% PF flush 3 mL     sodium chloride (PF) 0.9% PF flush 3 mL       Prescriptions Prior to Admission   Medication Sig Dispense Refill Last Dose     levETIRAcetam (KEPPRA) 500 MG tablet Take 1 tablet (500 mg) by mouth At Bedtime 60 tablet  4/24/2018     levETIRAcetam (KEPPRA) 750 MG tablet Take 2 tablets (1,500 mg) by mouth 2 times daily   4/24/2018     lisinopril (PRINIVIL/ZESTRIL) 20 MG tablet TAKE 1 TABLET(20 MG) BY MOUTH DAILY 90 tablet 1 4/24/2018     sertraline (ZOLOFT) 100 MG tablet Take 1 tablet (100 mg) by mouth daily Start with a half pill for the first week. 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     Seizure disorder (H) 1/16/2014        Past Surgical History:   Procedure Laterality Date     EXTERNAL EAR SURGERY         Social History   Substance Use Topics     Smoking status: Former Smoker     Types: Cigarettes     Quit date: 1/1/2002     Smokeless tobacco: Never Used     Alcohol use No      Comment: quit 2002       Family History   Problem Relation Age of Onset     Cancer - colorectal Father      Cancer - colorectal Mother          PHYSICAL EXAM:   /83  Resp 16  Ht 1.676 m (5' 6\")  Wt 64.9 kg (143 lb)  SpO2 100%  BMI 23.08 kg/m2 Estimated body mass index is 23.08 kg/(m^2) as calculated from the following:    Height as of this encounter: 1.676 m (5' 6\").    Weight as of this encounter: 64.9 kg (143 lb).   Mental status - alert and oriented  RESP: lungs clear  CV: RRR  AIRWAY EXAM: Mallampatti Class II (visualization of the soft palate, fauces, and uvula)    IMPRESSION   ASA Class 2 - Mild systemic disease      Signed Electronically by: Zain Farley  April 24, 2018    Colorectal Surgery  [phone] (office)  [phone] (pager)  [email or web address]          "

## 2018-04-27 LAB — COPATH REPORT: NORMAL

## 2018-07-02 ENCOUNTER — OFFICE VISIT (OUTPATIENT)
Dept: FAMILY MEDICINE | Facility: CLINIC | Age: 48
End: 2018-07-02

## 2018-07-02 VITALS
WEIGHT: 141.6 LBS | RESPIRATION RATE: 12 BRPM | HEIGHT: 66 IN | SYSTOLIC BLOOD PRESSURE: 124 MMHG | BODY MASS INDEX: 22.76 KG/M2 | HEART RATE: 68 BPM | DIASTOLIC BLOOD PRESSURE: 86 MMHG

## 2018-07-02 DIAGNOSIS — F43.22 ADJUSTMENT DISORDER WITH ANXIOUS MOOD: Primary | ICD-10-CM

## 2018-07-02 PROCEDURE — 99214 OFFICE O/P EST MOD 30 MIN: CPT | Performed by: FAMILY MEDICINE

## 2018-07-02 NOTE — PROGRESS NOTES
Problem(s) Oriented visit        SUBJECTIVE:                                                    Nhan Bryan is a 47 year old male who presents to clinic today for the following health issues :    1. Adjustment disorder with anxious mood  Here to discuss the possibility of new depression. Has previous history of depression.    Last PHQ-9 score on record=      PHQ-9 ([employer]) 8/28/2017 12/7/2017 7/2/2018   1.  Little interest or pleasure in doing things 2 0 2   2.  Feeling down, depressed, or hopeless 2 1 2   3.  Trouble falling or staying asleep, or sleeping too much 2 0 2   4.  Feeling tired or having little energy 1 1 1   5.  Poor appetite or overeating 1 0 0   6.  Feeling bad about yourself 1 0 1   7.  Trouble concentrating 1 0 1   8.  Moving slowly or restless 1 0 2   9.  Suicidal or self-harm thoughts 0 0 0   PHQ-9 Total Score 11 2 11   Difficulty at work, home, or with people Somewhat difficult Somewhat difficult Somewhat difficult       Current Life stressors: Brother is making transgender journey    Not Suicidal and willing to make promise to call if that would change.  - PSYCHOLOGY REFERRAL         Problem list, Medication list, Allergies, and Medical/Social/Surgical histories reviewed in EPIC and updated as appropriate.   Additional history: as documented    ROS:  General and CV completed and negative except as noted above    Histories:   Patient Active Problem List   Diagnosis     Seizure disorder (H)     Astrocytoma brain tumor (H)     Neurofibromatosis, peripheral, NF1 (H)     Health Care Home     Benign essential hypertension     Past Surgical History:   Procedure Laterality Date     COLONOSCOPY N/A 4/24/2018    Procedure: COMBINED COLONOSCOPY, SINGLE OR MULTIPLE BIOPSY/POLYPECTOMY BY BIOPSY;;  Surgeon: Zain Farley MD;  Location:  GI     EXTERNAL EAR SURGERY         Social History   Substance Use Topics     Smoking status: Former Smoker     Types: Cigarettes     Quit date: 1/1/2002      "Smokeless tobacco: Never Used     Alcohol use No      Comment: quit 2002     Family History   Problem Relation Age of Onset     Cancer - colorectal Father      Cancer - colorectal Mother            OBJECTIVE:                                                    /86  Pulse 68  Resp 12  Ht 1.67 m (5' 5.75\")  Wt 64.2 kg (141 lb 9.6 oz)  BMI 23.03 kg/m2  Body mass index is 23.03 kg/(m^2).   APPEARANCE: = alert and mild distress  Resp effort = Calm regular breathing  Recent/Remote Memory = Alert and Oriented x 3  NEURO = CN II-XII are tested and no deficits found  Mood/Affect =  worried     ASSESSMENT/PLAN:                                                    >25 min spent with patient, greater than 50% spent on discussion/education/planning, etc. About The encounter diagnosis was Adjustment disorder with anxious mood.        Nhan was seen today for hypertension, recheck medication and depression.    Diagnoses and all orders for this visit:    Adjustment disorder with anxious mood  -     PSYCHOLOGY REFERRAL    Lets recheck in a month.  See Patient Instructions    The following health maintenance items are reviewed in Epic and correct as of today:  Health Maintenance   Topic Date Due     DEPRESSION ACTION PLAN Q1 YR  12/31/1986     HIV SCREEN (SYSTEM ASSIGNED)  12/31/1986     PHQ-9 Q6 MONTHS  06/07/2018     INFLUENZA VACCINE (1) 09/01/2018     COLONOSCOPY Q3 YR  04/24/2021     LIPID SCREEN Q5 YR MALE (SYSTEM ASSIGNED)  05/09/2021     TETANUS IMMUNIZATION (SYSTEM ASSIGNED)  05/26/2026       Claudy Juarez MD  [facility]  Family Practice  [facility]  [phone]    For any issues my office # is [phone]        "

## 2018-07-02 NOTE — MR AVS SNAPSHOT
After Visit Summary   7/2/2018    Nhan Bryan    MRN: [id number]           Patient Information     Date Of Birth          1970        Visit Information        Provider Department      7/2/2018 11:15 AM Claudy Juarez MD [facility]        Today's Diagnoses     Adjustment disorder with anxious mood    -  1       Follow-ups after your visit        Additional Services     PSYCHOLOGY REFERRAL       Your provider has referred you to:  Ashley Walters    Please be aware that coverage of these services is subject to the terms and limitations of your health insurance plan.  Call member services at your health plan with any benefit or coverage questions.      Please bring the following to your appointment:    >>   Any x-rays, CTs or MRIs which have been performed.  Contact the facility where they were done to arrange for  prior to your scheduled appointment.   >>   List of current medications   >>   This referral request   >>   Any documents/labs given to you for this referral                  Who to contact     If you have questions or need follow up information about today's clinic visit or your schedule please contact [facility] directly at [phone].  Normal or non-critical lab and imaging results will be communicated to you by MyChart, letter or phone within 4 business days after the clinic has received the results. If you do not hear from us within 7 days, please contact the clinic through MyChart or phone. If you have a critical or abnormal lab result, we will notify you by phone as soon as possible.  Submit refill requests through [employer] or call your pharmacy and they will forward the refill request to us. Please allow 3 business days for your refill to be completed.          Additional Information About Your Visit        MyChart Information     [employer] gives you secure access to your electronic health record. If you see a primary care provider, you  "can also send messages to your care team and make appointments. If you have questions, please call your primary care clinic.  If you do not have a primary care provider, please call [phone] and they will assist you.        Care EveryWhere ID     This is your Care EveryWhere ID. This could be used by other organizations to access your [place] medical records  [id number]        Your Vitals Were     Pulse Respirations Height BMI (Body Mass Index)          68 12 1.67 m (5' 5.75\") 23.03 kg/m2         Blood Pressure from Last 3 Encounters:   07/02/18 124/86   04/24/18 106/79   12/07/17 122/80    Weight from Last 3 Encounters:   07/02/18 64.2 kg (141 lb 9.6 oz)   04/24/18 64.9 kg (143 lb)   12/07/17 64.9 kg (143 lb)              We Performed the Following     PSYCHOLOGY REFERRAL        Primary Care Provider Office Phone # Fax #    Claudy Juraez [phone] [address]  [facility] [address]        Equal Access to Services     [facility]: Hadii aad ku hadasho Soomaali, waaxda luqadaha, qaybta kaalmada adefabiana, zuleima mendez . So [facility] [phone].    ATENCIÓN: Si habla español, tiene a gould disposición servicios gratuitos de asistencia lingüística. Nathalie al [phone].    We comply with applicable federal civil rights laws and [place] laws. We do not discriminate on the basis of race, color, national origin, age, disability, sex, sexual orientation, or gender identity.            Thank you!     Thank you for choosing [facility]  for your care. Our goal is always to provide you with excellent care. Hearing back from our patients is one way we can continue to improve our services. Please take a few minutes to complete the written survey that you may receive in the mail after your visit with us. Thank you!             Your Updated Medication List - Protect others around you: Learn how to safely use, store and throw away your medicines " at www.disposemymeds.org.          This list is accurate as of 7/2/18 11:55 AM.  Always use your most recent med list.                   Brand Name Dispense Instructions for use Diagnosis    * levETIRAcetam 500 MG tablet    KEPPRA    60 tablet    Take 1 tablet (500 mg) by mouth At Bedtime        * levETIRAcetam 750 MG tablet    KEPPRA     Take 2 tablets (1,500 mg) by mouth 2 times daily        lisinopril 20 MG tablet    PRINIVIL/ZESTRIL    90 tablet    TAKE 1 TABLET(20 MG) BY MOUTH DAILY    Encounter for medication refill       sertraline 100 MG tablet    ZOLOFT    90 tablet    Take 1 tablet (100 mg) by mouth daily Start with a half pill for the first week.    Moderate single current episode of major depressive disorder (H)       * Notice:  This list has 2 medication(s) that are the same as other medications prescribed for you. Read the directions carefully, and ask your doctor or other care provider to review them with you.

## 2018-07-02 NOTE — LETTER
My Depression Action Plan  Name: Nhan Bryan   Date of Birth 1970  Date: 7/2/2018    My doctor: Claudy Juarez   My clinic: [facility] [address] [place] [address] [phone]          GREEN    ZONE   Good Control    What it looks like:     Things are going generally well. You have normal up s and down s. You may even feel depressed from time to time, but bad moods usually last less than a day.   What you need to do:  1. Continue to care for yourself (see self care plan)  2. Check your depression survival kit and update it as needed  3. Follow your physician s recommendations including any medication.  4. Do not stop taking medication unless you consult with your physician first.           YELLOW         ZONE Getting Worse    What it looks like:     Depression is starting to interfere with your life.     It may be hard to get out of bed; you may be starting to isolate yourself from others.    Symptoms of depression are starting to last most all day and this has happened for several days.     You may have suicidal thoughts but they are not constant.   What you need to do:     1. Call your care team, your response to treatment will improve if you keep your care team informed of your progress. Yellow periods are signs an adjustment may need to be made.     2. Continue your self-care, even if you have to fake it!    3. Talk to someone in your support network    4. Open up your depression survival kit           RED    ZONE Medical Alert - Get Help    What it looks like:     Depression is seriously interfering with your life.     You may experience these or other symptoms: You can t get out of bed most days, can t work or engage in other necessary activities, you have trouble taking care of basic hygiene, or basic responsibilities, thoughts of suicide or death that will not go away, self-injurious behavior.     What you need to do:  1. Call your care team  and request a same-day appointment. If they are not available (weekends or after hours) call your local crisis line, emergency room or 911.            Depression Self Care Plan / Survival Kit    Self-Care for Depression  Here s the deal. Your body and mind are really not as separate as most people think.  What you do and think affects how you feel and how you feel influences what you do and think. This means if you do things that people who feel good do, it will help you feel better.  Sometimes this is all it takes.  There is also a place for medication and therapy depending on how severe your depression is, so be sure to consult with your medical provider and/ or Behavioral Health Consultant if your symptoms are worsening or not improving.     In order to better manage my stress, I will:    Exercise  Get some form of exercise, every day. This will help reduce pain and release endorphins, the  feel good  chemicals in your brain. This is almost as good as taking antidepressants!  This is not the same as joining a gym and then never going! (they count on that by the way ) It can be as simple as just going for a walk or doing some gardening, anything that will get you moving.      Hygiene   Maintain good hygiene (Get out of bed in the morning, Make your bed, Brush your teeth, Take a shower, and Get dressed like you were going to work, even if you are unemployed).  If your clothes don't fit try to get ones that do.    Diet  I will strive to eat foods that are good for me, drink plenty of water, and avoid excessive sugar, caffeine, alcohol, and other mood-altering substances.  Some foods that are helpful in depression are: complex carbohydrates, B vitamins, flaxseed, fish or fish oil, fresh fruits and vegetables.    Psychotherapy  I agree to participate in Individual Therapy (if recommended).    Medication  If prescribed medications, I agree to take them.  Missing doses can result in serious side effects.  I understand  that drinking alcohol, or other illicit drug use, may cause potential side effects.  I will not stop my medication abruptly without first discussing it with my provider.    Staying Connected With Others  I will stay in touch with my friends, family members, and my primary care provider/team.    Use your imagination  Be creative.  We all have a creative side; it doesn t matter if it s oil painting, sand castles, or mud pies! This will also kick up the endorphins.    Witness Beauty  (AKA stop and smell the roses) Take a look outside, even in mid-winter. Notice colors, textures. Watch the squirrels and birds.     Service to others  Be of service to others.  There is always someone else in need.  By helping others we can  get out of ourselves  and remember the really important things.  This also provides opportunities for practicing all the other parts of the program.    Humor  Laugh and be silly!  Adjust your TV habits for less news and crime-drama and more comedy.    Control your stress  Try breathing deep, massage therapy, biofeedback, and meditation. Find time to relax each day.     My support system    Clinic Contact:  Phone number:    Contact 1:  Phone number:    Contact 2:  Phone number:    Confucianist/:  Phone number:    Therapist:  Phone number:    Local crisis center:    Phone number:    Other community support:  Phone number:

## 2018-07-03 ASSESSMENT — PATIENT HEALTH QUESTIONNAIRE - PHQ9: SUM OF ALL RESPONSES TO PHQ QUESTIONS 1-9: 11

## 2018-08-09 ENCOUNTER — OFFICE VISIT (OUTPATIENT)
Dept: FAMILY MEDICINE | Facility: CLINIC | Age: 48
End: 2018-08-09

## 2018-08-09 VITALS
DIASTOLIC BLOOD PRESSURE: 68 MMHG | WEIGHT: 143 LBS | HEART RATE: 68 BPM | SYSTOLIC BLOOD PRESSURE: 124 MMHG | BODY MASS INDEX: 23.26 KG/M2 | RESPIRATION RATE: 12 BRPM

## 2018-08-09 DIAGNOSIS — F32.1 MODERATE SINGLE CURRENT EPISODE OF MAJOR DEPRESSIVE DISORDER (H): ICD-10-CM

## 2018-08-09 DIAGNOSIS — I10 BENIGN ESSENTIAL HYPERTENSION: Primary | ICD-10-CM

## 2018-08-09 PROCEDURE — 99213 OFFICE O/P EST LOW 20 MIN: CPT | Performed by: FAMILY MEDICINE

## 2018-08-09 RX ORDER — LISINOPRIL 20 MG/1
TABLET ORAL
Qty: 90 TABLET | Refills: 3 | Status: SHIPPED | OUTPATIENT
Start: 2018-08-09 | End: 2019-08-22

## 2018-08-09 RX ORDER — SERTRALINE HYDROCHLORIDE 100 MG/1
100 TABLET, FILM COATED ORAL DAILY
Qty: 90 TABLET | Refills: 3 | Status: SHIPPED | OUTPATIENT
Start: 2018-08-09 | End: 2019-08-22

## 2018-08-09 ASSESSMENT — ANXIETY QUESTIONNAIRES
IF YOU CHECKED OFF ANY PROBLEMS ON THIS QUESTIONNAIRE, HOW DIFFICULT HAVE THESE PROBLEMS MADE IT FOR YOU TO DO YOUR WORK, TAKE CARE OF THINGS AT HOME, OR GET ALONG WITH OTHER PEOPLE: SOMEWHAT DIFFICULT
5. BEING SO RESTLESS THAT IT IS HARD TO SIT STILL: SEVERAL DAYS
6. BECOMING EASILY ANNOYED OR IRRITABLE: MORE THAN HALF THE DAYS
7. FEELING AFRAID AS IF SOMETHING AWFUL MIGHT HAPPEN: NOT AT ALL
GAD7 TOTAL SCORE: 7
3. WORRYING TOO MUCH ABOUT DIFFERENT THINGS: SEVERAL DAYS
1. FEELING NERVOUS, ANXIOUS, OR ON EDGE: MORE THAN HALF THE DAYS
2. NOT BEING ABLE TO STOP OR CONTROL WORRYING: SEVERAL DAYS

## 2018-08-09 ASSESSMENT — PATIENT HEALTH QUESTIONNAIRE - PHQ9: 5. POOR APPETITE OR OVEREATING: NOT AT ALL

## 2018-08-09 NOTE — MR AVS SNAPSHOT
After Visit Summary   8/9/2018    Nhan Bryan    MRN: [id number]           Patient Information     Date Of Birth          1970        Visit Information        Provider Department      8/9/2018 3:30 PM Claudy Juarez MD [facility]        Today's Diagnoses     Benign essential hypertension    -  1    Moderate single current episode of major depressive disorder (H)           Follow-ups after your visit        Who to contact     If you have questions or need follow up information about today's clinic visit or your schedule please contact [facility] directly at [phone].  Normal or non-critical lab and imaging results will be communicated to you by iovoxhart, letter or phone within 4 business days after the clinic has received the results. If you do not hear from us within 7 days, please contact the clinic through [employer] or phone. If you have a critical or abnormal lab result, we will notify you by phone as soon as possible.  Submit refill requests through [employer] or call your pharmacy and they will forward the refill request to us. Please allow 3 business days for your refill to be completed.          Additional Information About Your Visit        MyChart Information     [employer] gives you secure access to your electronic health record. If you see a primary care provider, you can also send messages to your care team and make appointments. If you have questions, please call your primary care clinic.  If you do not have a primary care provider, please call [phone] and they will assist you.        Care EveryWhere ID     This is your Care EveryWhere ID. This could be used by other organizations to access your [place] medical records  [id number]        Your Vitals Were     Pulse Respirations BMI (Body Mass Index)             68 12 23.26 kg/m2          Blood Pressure from Last 3 Encounters:   08/09/18 124/68   07/02/18 124/86   04/24/18 106/79    Weight  from Last 3 Encounters:   08/09/18 64.9 kg (143 lb)   07/02/18 64.2 kg (141 lb 9.6 oz)   04/24/18 64.9 kg (143 lb)              Today, you had the following     No orders found for display         Where to get your medicines      These medications were sent to [id number] - [facility] [address] AT [facility] [address], [facility] [address]     Phone:  [phone]     lisinopril 20 MG tablet    sertraline 100 MG tablet          Primary Care Provider Office Phone # Fax #    Claudy Juarez [phone] [address]  [facility] [address]        Equal Access to Services     VIOLETA SCHAFER : Hadii chio morales hadasho Soomaali, waaxda luqadaha, qaybta kaalmada adeegyada, zuleima mendez . So [facility] [phone].    ATENCIÓN: Si habla español, tiene a gould disposición servicios gratuitos de asistencia lingüística. Nathalie al [phone].    We comply with applicable federal civil rights laws and [place] laws. We do not discriminate on the basis of race, color, national origin, age, disability, sex, sexual orientation, or gender identity.            Thank you!     Thank you for choosing [facility]  for your care. Our goal is always to provide you with excellent care. Hearing back from our patients is one way we can continue to improve our services. Please take a few minutes to complete the written survey that you may receive in the mail after your visit with us. Thank you!             Your Updated Medication List - Protect others around you: Learn how to safely use, store and throw away your medicines at www.disposemymeds.org.          This list is accurate as of 8/9/18  4:04 PM.  Always use your most recent med list.                   Brand Name Dispense Instructions for use Diagnosis    * levETIRAcetam 500 MG tablet    KEPPRA    60 tablet    Take 1 tablet (500 mg) by mouth At Bedtime        * levETIRAcetam  750 MG tablet    KEPPRA     Take 2 tablets (1,500 mg) by mouth 2 times daily        lisinopril 20 MG tablet    PRINIVIL/ZESTRIL    90 tablet    TAKE 1 TABLET(20 MG) BY MOUTH DAILY        sertraline 100 MG tablet    ZOLOFT    90 tablet    Take 1 tablet (100 mg) by mouth daily Start with a half pill for the first week.    Moderate single current episode of major depressive disorder (H)       * Notice:  This list has 2 medication(s) that are the same as other medications prescribed for you. Read the directions carefully, and ask your doctor or other care provider to review them with you.

## 2018-08-09 NOTE — PROGRESS NOTES
"Depression:  Has known history of depression.  Has been on medication for this.  The patient does not report any significant side effects of this medication.  The prior symptoms leading to the original diagnosis and decision to start medication therapy are better.     Appetite is stable.  Sleeping patterns are stable.  No reported thoughts of suicide or homicide.    Last 3 PHQ9 results:  PHQ-9 SCORE 8/28/2017 12/7/2017 7/2/2018 8/9/2018   Total Score 11 2 11 4     ANXIETY:  Has known history of anxiety and has been on medication for this.  The patient does not report any significant side effects of this medication.  The prior symptoms leading to the original diagnosis and decision to start medication therapy are better.     Appetite is stable.  Sleeping patterns are stable.    Overall, the level of \"racing thoughts\", emotional lability, and ease of agitation are better.    No recent panic attacks.    ADINA-7 SCORE 6/19/2017 12/7/2017 8/9/2018   Total Score 8 6 7       Working with [employer] and it is going well!  Blood presure remains well controlled when checked out of clinic.    Reviewed last 6 BP readings in chart:  BP Readings from Last 6 Encounters:   08/09/18 124/68   07/02/18 124/86   04/24/18 106/79   12/07/17 122/80   08/28/17 115/80   07/12/17 114/81       he has not experienced any significant side effects from medications for hypertension.    NO active cardiac complaints or symptoms with exercise.  Problem(s) Oriented visit      ROS:  General and CV completed and negative except as noted above     HISTORY:   reports that he does not drink alcohol.   reports that he quit smoking about 16 years ago. His smoking use included Cigarettes. He has never used smokeless tobacco.    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    EXAM:  APPEARANCE: = Relaxed and in no distress  Conj/Eyelids = noninjected and lids and lashes are without inflammation  PERRLA/Irises = Pupils Round Reactive to Light and Irisis without inflammation  Neck = No anterior or posterior adenopathy appreciated.  Thyroid = Not enlarged and no masses felt  Resp effort = Calm regular breathing  Breath Sounds = Good air movement with no rales or rhonchi in any lung fields  Heart Rate, Rythym, & sounds (no Murm)  = Regular rate and rythym with no S3, S4, or murmer appreciated.  Carotid Art's = Pulses full and equal and no bruits appreciated  Abdomen = Soft, nontender, no masses, & bowel sounds in all quadrants  Liver/Spleen = Normal span and no splenomegaly noted  Digits and Nails = FROM in all finger joints, no nail dystrophy  Ext (edema) = No pretibial edema noted or elsewhere  Musculsktl =  Strength and ROM of major joints are within normal limits  SKIN = absent significant rashes or lesions   Recent/Remote Memory = Alert and Oriented x 3  Mood/Affect = Cooperative and interested      Assessment/Plan:  Nhan was seen today for anxiety, recheck medication and hypertension.    Diagnoses and all orders for this visit:    Benign essential hypertension    Moderate single current episode of major depressive disorder (H)  -     sertraline (ZOLOFT) 100 MG tablet; Take 1 tablet (100 mg) by mouth daily Start with a half pill for the first week.    Encounter for medication refill  -     lisinopril (PRINIVIL/ZESTRIL)  "20 MG tablet; TAKE 1 TABLET(20 MG) BY MOUTH DAILY        COUNSELING:   reports that he quit smoking about 16 years ago. His smoking use included Cigarettes. He has never used smokeless tobacco.    Estimated body mass index is 23.26 kg/(m^2) as calculated from the following:    Height as of 7/2/18: 1.67 m (5' 5.75\").    Weight as of this encounter: 64.9 kg (143 lb).       Appropriate preventive services were discussed with this patient, including applicable screening as appropriate for cardiovascular disease, diabetes, osteopenia/osteoporosis, and glaucoma.  As appropriate for age/gender, discussed screening for colorectal cancer, prostate cancer, breast cancer, and cervical cancer. Checklist reviewing preventive services available has been given to the patient.    Reviewed patients plan of care and provided an AVS. The Basic Care Plan (routine screening as documented in Health Maintenance) for Nhan meets the Care Plan requirement. This Care Plan has been established and reviewed with the  Patient.      The following health maintenance items are reviewed in Epic and correct as of today:  Health Maintenance   Topic Date Due     HIV SCREEN (SYSTEM ASSIGNED)  12/31/1986     INFLUENZA VACCINE (1) 09/01/2018     PHQ-9 Q6 MONTHS  01/02/2019     DEPRESSION ACTION PLAN Q1 YR  07/02/2019     COLONOSCOPY Q3 YR  04/24/2021     LIPID SCREEN Q5 YR MALE (SYSTEM ASSIGNED)  05/09/2021     TETANUS IMMUNIZATION (SYSTEM ASSIGNED)  05/26/2026       Claudy Juarez  [facility]  For any issues my office # is [phone]              "

## 2018-08-10 ASSESSMENT — PATIENT HEALTH QUESTIONNAIRE - PHQ9: SUM OF ALL RESPONSES TO PHQ QUESTIONS 1-9: 4

## 2018-08-10 ASSESSMENT — ANXIETY QUESTIONNAIRES: GAD7 TOTAL SCORE: 7

## 2019-08-22 ENCOUNTER — OFFICE VISIT (OUTPATIENT)
Dept: FAMILY MEDICINE | Facility: CLINIC | Age: 49
End: 2019-08-22

## 2019-08-22 VITALS
BODY MASS INDEX: 24.07 KG/M2 | HEART RATE: 88 BPM | OXYGEN SATURATION: 97 % | RESPIRATION RATE: 16 BRPM | WEIGHT: 148 LBS | SYSTOLIC BLOOD PRESSURE: 126 MMHG | DIASTOLIC BLOOD PRESSURE: 82 MMHG

## 2019-08-22 DIAGNOSIS — I10 BENIGN ESSENTIAL HYPERTENSION: ICD-10-CM

## 2019-08-22 DIAGNOSIS — F32.5 MAJOR DEPRESSION IN COMPLETE REMISSION (H): ICD-10-CM

## 2019-08-22 DIAGNOSIS — F32.1 MODERATE SINGLE CURRENT EPISODE OF MAJOR DEPRESSIVE DISORDER (H): ICD-10-CM

## 2019-08-22 PROCEDURE — 99214 OFFICE O/P EST MOD 30 MIN: CPT | Performed by: FAMILY MEDICINE

## 2019-08-22 RX ORDER — LISINOPRIL 20 MG/1
TABLET ORAL
Qty: 90 TABLET | Refills: 3 | Status: SHIPPED | OUTPATIENT
Start: 2019-08-22 | End: 2020-09-04

## 2019-08-22 RX ORDER — SERTRALINE HYDROCHLORIDE 100 MG/1
100 TABLET, FILM COATED ORAL DAILY
Qty: 90 TABLET | Refills: 3 | Status: SHIPPED | OUTPATIENT
Start: 2019-08-22 | End: 2020-09-04

## 2019-08-22 ASSESSMENT — ANXIETY QUESTIONNAIRES
GAD7 TOTAL SCORE: 4
3. WORRYING TOO MUCH ABOUT DIFFERENT THINGS: SEVERAL DAYS
5. BEING SO RESTLESS THAT IT IS HARD TO SIT STILL: SEVERAL DAYS
7. FEELING AFRAID AS IF SOMETHING AWFUL MIGHT HAPPEN: NOT AT ALL
1. FEELING NERVOUS, ANXIOUS, OR ON EDGE: SEVERAL DAYS
2. NOT BEING ABLE TO STOP OR CONTROL WORRYING: NOT AT ALL
IF YOU CHECKED OFF ANY PROBLEMS ON THIS QUESTIONNAIRE, HOW DIFFICULT HAVE THESE PROBLEMS MADE IT FOR YOU TO DO YOUR WORK, TAKE CARE OF THINGS AT HOME, OR GET ALONG WITH OTHER PEOPLE: NOT DIFFICULT AT ALL
6. BECOMING EASILY ANNOYED OR IRRITABLE: SEVERAL DAYS

## 2019-08-22 ASSESSMENT — PATIENT HEALTH QUESTIONNAIRE - PHQ9
SUM OF ALL RESPONSES TO PHQ QUESTIONS 1-9: 3
5. POOR APPETITE OR OVEREATING: NOT AT ALL

## 2019-08-22 NOTE — PROGRESS NOTES
Depression:  Has known history of depression.  Has been on medication for this.  The patient does not report any significant side effects of this medication.  The prior symptoms leading to the original diagnosis and decision to start medication therapy are better.     Appetite is stable.  Sleeping patterns are stable.  No reported thoughts of suicide or homicide.  Feeling ok,  Lots of stress but doing well    Blood presure remains well controlled when checked out of clinic.    Reviewed last 6 BP readings in chart:  BP Readings from Last 6 Encounters:   08/22/19 126/82   08/09/18 124/68   07/02/18 124/86   04/24/18 106/79   12/07/17 122/80   08/28/17 115/80     .  he has not experienced any significant side effects from medications for hypertension.    NO active cardiac complaints or symptoms with exercise.    Last 3 PHQ9 results:  PHQ-9 SCORE 12/7/2017 7/2/2018 8/9/2018 8/22/2019   PHQ-9 Total Score 2 11 4 3     Problem(s) Oriented visit      ROS:  General and Resp. completed and negative except as noted above     HISTORY:   reports that he does not drink alcohol.   reports that he quit smoking about 17 years ago. His smoking use included cigarettes. He has never used smokeless tobacco.    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    EXAM:  APPEARANCE: = Relaxed and in no  distress  Conj/Eyelids = noninjected and lids and lashes are without inflammation  PERRLA/Irises = Pupils Round Reactive to Light and Irisis without inflammation  Neck = No anterior or posterior adenopathy appreciated.  Thyroid = Not enlarged and no masses felt  Resp effort = Calm regular breathing  Breath Sounds = Good air movement with no rales or rhonchi in any lung fields  Heart Rate, Rythym, & sounds (no Murm)  = Regular rate and rythym with no S3, S4, or murmer appreciated.  Carotid Art's = Pulses full and equal and no bruits appreciated  Abdomen = Soft, nontender, no masses, & bowel sounds in all quadrants  Liver/Spleen = Normal span and no splenomegaly noted  Digits and Nails = FROM in all finger joints, no nail dystrophy  Ext (edema) = No pretibial edema noted or elsewhere  Musculsktl =  Strength and ROM of major joints are within normal limits  SKIN = absent significant rashes or lesions   Recent/Remote Memory = Alert and Oriented x 3  Mood/Affect = Cooperative and interested    Assessment/Plan:  Nhan was seen today for hypertension and depression.    Diagnoses and all orders for this visit:      Moderate single current episode of major depressive disorder (H)  Spoke at length about mood disorders including suspected pathophysiology, role of neurotransmitters, and treatment options including medication options ( especially SSRIs that treat cause of sx) and counselling.    -     sertraline (ZOLOFT) 100 MG tablet; Take 1 tablet (100 mg) by mouth daily Start with a half pill for the first week.    Benign essential hypertension  Discussed current hypertension treatment guidelines, including indications for treatment and treatment options.  Discussed the importance for aggressive management of HTN to prevent vascular complications later.  Recommended lower fat, lower carbohydrate, and lower sodium (<2000 mg)diet.  Discussed required intervals for follow up on HTN, lab studies.  Recommened pt. follow  "their blood pressures outside the clinic to ensure that BPs are remaining within guidelines, and to contact me if the readings are not within guidelines on a regular basis so we can adjust treatment as needed.    -     lisinopril (PRINIVIL/ZESTRIL) 20 MG tablet; TAKE 1 TABLET(20 MG) BY MOUTH DAILY              >25 min spent with patient, greater than 50% spent on discussion/education/planning, etc. About Diagnoses of Major depression in complete remission (H), Moderate single current episode of major depressive disorder (H), and Benign essential hypertension were pertinent to this visit.      COUNSELING:   reports that he quit smoking about 17 years ago. His smoking use included cigarettes. He has never used smokeless tobacco.    Estimated body mass index is 24.07 kg/m  as calculated from the following:    Height as of 7/2/18: 1.67 m (5' 5.75\").    Weight as of this encounter: 67.1 kg (148 lb).       Appropriate preventive services were discussed with this patient, including applicable screening as appropriate for cardiovascular disease, diabetes, osteopenia/osteoporosis, and glaucoma.  As appropriate for age/gender, discussed screening for colorectal cancer, prostate cancer, breast cancer, and cervical cancer. Checklist reviewing preventive services available has been given to the patient.    Reviewed patients plan of care and provided an AVS. The Basic Care Plan (routine screening as documented in Health Maintenance) for Nhan meets the Care Plan requirement. This Care Plan has been established and reviewed with the  Patient.      The following health maintenance items are reviewed in Epic and correct as of today:  Health Maintenance   Topic Date Due     PREVENTIVE CARE VISIT  1970     HIV SCREENING  12/31/1983     PHQ-9  02/09/2019     ZOSTER IMMUNIZATION (1 of 2) 12/31/2018     INFLUENZA VACCINE (1) 09/01/2019     COLONOSCOPY  04/24/2021     LIPID  05/09/2021     DTAP/TDAP/TD IMMUNIZATION (2 - Td) " 05/26/2026     DEPRESSION ACTION PLAN  Completed     IPV IMMUNIZATION  Aged Out     MENINGITIS IMMUNIZATION  Aged Out       Claudy Juarez  [facility]  For any issues my office # is [phone]

## 2019-08-23 ASSESSMENT — ANXIETY QUESTIONNAIRES: GAD7 TOTAL SCORE: 4

## 2019-11-03 ENCOUNTER — HEALTH MAINTENANCE LETTER (OUTPATIENT)
Age: 49
End: 2019-11-03

## 2019-11-25 ENCOUNTER — TRANSFERRED RECORDS (OUTPATIENT)
Dept: HEALTH INFORMATION MANAGEMENT | Facility: CLINIC | Age: 49
End: 2019-11-25

## 2020-05-20 ENCOUNTER — TRANSFERRED RECORDS (OUTPATIENT)
Dept: FAMILY MEDICINE | Facility: CLINIC | Age: 50
End: 2020-05-20

## 2020-09-04 DIAGNOSIS — F32.1 MODERATE SINGLE CURRENT EPISODE OF MAJOR DEPRESSIVE DISORDER (H): ICD-10-CM

## 2020-09-04 DIAGNOSIS — I10 BENIGN ESSENTIAL HYPERTENSION: ICD-10-CM

## 2020-09-04 NOTE — TELEPHONE ENCOUNTER
sertraline (ZOLOFT) 100 MG  lisinopril (ZESTRIL) 20 MG     Last OV -8/22/19  Last labs 2017 - overdue    BP Readings from Last 3 Encounters:   08/22/19 126/82   08/09/18 124/68   07/02/18 124/86     Last Comprehensive Metabolic Panel:  Sodium   Date Value Ref Range Status   02/14/2017 136 133 - 144 mmol/L Final     Potassium   Date Value Ref Range Status   02/14/2017 3.7 3.4 - 5.3 mmol/L Final     Chloride   Date Value Ref Range Status   02/14/2017 99 94 - 109 mmol/L Final     Carbon Dioxide   Date Value Ref Range Status   02/14/2017 30 20 - 32 mmol/L Final     Anion Gap   Date Value Ref Range Status   02/14/2017 7 3 - 14 mmol/L Final     Glucose   Date Value Ref Range Status   02/14/2017 106 (H) 70 - 99 mg/dL Final     Urea Nitrogen   Date Value Ref Range Status   02/14/2017 10 7 - 30 mg/dL Final     Creatinine   Date Value Ref Range Status   02/14/2017 0.84 0.66 - 1.25 mg/dL Final     GFR Estimate   Date Value Ref Range Status   02/14/2017 >90  Non  GFR Calc   >60 mL/min/1.7m2 Final     Calcium   Date Value Ref Range Status   02/14/2017 8.5 8.5 - 10.1 mg/dL Final     PHQ 7/2/2018 8/9/2018 8/22/2019   PHQ-9 Total Score 11 4 3   Q9: Thoughts of better off dead/self-harm past 2 weeks Not at all Not at all Not at all

## 2020-09-07 RX ORDER — SERTRALINE HYDROCHLORIDE 100 MG/1
TABLET, FILM COATED ORAL
Qty: 30 TABLET | Refills: 0 | Status: SHIPPED | OUTPATIENT
Start: 2020-09-07 | End: 2020-09-15

## 2020-09-07 RX ORDER — LISINOPRIL 20 MG/1
TABLET ORAL
Qty: 30 TABLET | Refills: 0 | Status: SHIPPED | OUTPATIENT
Start: 2020-09-07 | End: 2020-09-15

## 2020-09-15 ENCOUNTER — VIRTUAL VISIT (OUTPATIENT)
Dept: FAMILY MEDICINE | Facility: CLINIC | Age: 50
End: 2020-09-15

## 2020-09-15 VITALS — DIASTOLIC BLOOD PRESSURE: 81 MMHG | SYSTOLIC BLOOD PRESSURE: 119 MMHG | HEART RATE: 81 BPM

## 2020-09-15 DIAGNOSIS — G40.909 SEIZURE DISORDER (H): Primary | ICD-10-CM

## 2020-09-15 DIAGNOSIS — C71.9 ASTROCYTOMA BRAIN TUMOR (H): ICD-10-CM

## 2020-09-15 DIAGNOSIS — F32.1 MODERATE SINGLE CURRENT EPISODE OF MAJOR DEPRESSIVE DISORDER (H): ICD-10-CM

## 2020-09-15 DIAGNOSIS — Z12.5 SCREENING FOR PROSTATE CANCER: ICD-10-CM

## 2020-09-15 DIAGNOSIS — F32.5 MAJOR DEPRESSION IN COMPLETE REMISSION (H): ICD-10-CM

## 2020-09-15 DIAGNOSIS — I10 BENIGN ESSENTIAL HYPERTENSION: ICD-10-CM

## 2020-09-15 PROCEDURE — 99214 OFFICE O/P EST MOD 30 MIN: CPT | Mod: 95 | Performed by: FAMILY MEDICINE

## 2020-09-15 RX ORDER — SERTRALINE HYDROCHLORIDE 100 MG/1
TABLET, FILM COATED ORAL
Qty: 90 TABLET | Refills: 3 | Status: SHIPPED | OUTPATIENT
Start: 2020-09-15 | End: 2021-09-21

## 2020-09-15 RX ORDER — LISINOPRIL 20 MG/1
20 TABLET ORAL DAILY
Qty: 90 TABLET | Refills: 3 | Status: SHIPPED | OUTPATIENT
Start: 2020-09-15 | End: 2021-09-21

## 2020-09-15 ASSESSMENT — PATIENT HEALTH QUESTIONNAIRE - PHQ9: SUM OF ALL RESPONSES TO PHQ QUESTIONS 1-9: 1

## 2020-09-15 NOTE — PROGRESS NOTES
"    Video Problem(s) Oriented visit      Video Start Time: 4:04 PM    The patient has been notified of following:     \"This video visit will be conducted via a call between you and your physician/provider. We have found that certain health care needs can be provided without the need for an in-person physical exam.  This service lets us provide the care you need with a video conversation.  If a prescription is necessary we can send it directly to your pharmacy.  If lab work is needed we can place an order for that and you can then stop by our lab to have the test done at a later time.    Video visits are billed at different rates depending on your insurance coverage.  Please reach out to your insurance provider with any questions.    If during the course of the call the physician/provider feels a video visit is not appropriate, you will not be charged for this service.\"    Patient has given verbal consent for Video visit? Yes    How would you like to obtain your AVS? MyChart    Will anyone else be joining your video visit? No  SUBJECTIVE:                                                    Nhan Bryan is a 49 year old male who presents to clinic today for the following health issues :    Depression:  Has known history of depression.  Has been on medication for this.  The patient does not report any significant side effects of this medication.  The prior symptoms leading to the original diagnosis and decision to start medication therapy are better.     Appetite is stable.  Sleeping patterns are stable.  No reported thoughts of suicide or homicide.    Last 3 PHQ9 results:  PHQ-9 SCORE 7/2/2018 8/9/2018 8/22/2019 9/15/2020   PHQ-9 Total Score 11 4 3 1     Essential Hypertension   Remains well controlled when checked out of clinic.   he has not experienced any significant side effects from medications for hypertension.    NO active cardiac complaints or symptoms with exercise.  Current medications for " treatment:  ACE inhibitors (Lisinopril, Ramipril,Captopril,Benazepril)    Reviewed last 6 BP readings in chart:  BP Readings from Last 6 Encounters:   19 126/82   18 124/68   18 124/86   18 106/79   17 122/80   17 115/80       Seeing Neurologist.    Work is going much better        5. Screening for prostate cancer  due  - PSA Serum ([facility]); Future           Problem list, Medication list, Allergies, and Medical/Social/Surgical histories reviewed in [facility] and updated as appropriate.   Additional history: as documented    ROS:  General and CV completed and negative except as noted above    Histories:   Patient Active Problem List   Diagnosis     Seizure disorder (H)     Astrocytoma brain tumor (H)     Neurofibromatosis, peripheral, NF1 (H)     Health Care Home     Benign essential hypertension     Major depression in complete remission (H)     Past Surgical History:   Procedure Laterality Date     COLONOSCOPY N/A 2018    Procedure: COMBINED COLONOSCOPY, SINGLE OR MULTIPLE BIOPSY/POLYPECTOMY BY BIOPSY;;  Surgeon: Zain Farley MD;  Location:  GI     EXTERNAL EAR SURGERY         Social History     Tobacco Use     Smoking status: Former Smoker     Types: Cigarettes     Last attempt to quit: 2002     Years since quittin.7     Smokeless tobacco: Never Used   Substance Use Topics     Alcohol use: No     Alcohol/week: 0.0 standard drinks     Comment: quit      Family History   Problem Relation Age of Onset     Cancer - colorectal Father      Cancer - colorectal Mother            OBJECTIVE:                                                    /81   Pulse 81   There is no height or weight on file to calculate BMI.   APPEARANCE: = Relaxed and in no distress  Resp effort = Calm regular breathing  Recent/Remote Memory = Alert and Oriented x 3  Mood/Affect = Cooperative and interested     ASSESSMENT/PLAN:                                                         Nhan was seen today for recheck medication.    Diagnoses and all orders for this visit:    Seizure disorder (H)  We reviewed the patient's seizure history.   -Discussed seizure as a symptom, alternate etiology of convulsive episodes, epilepsy, chance of recurrence, family history.  -Discussed patient's personal imaging results and how these contribute to clinical decision making and management of seizure.   -Discussed management of seizure and antiepileptic medications.   -Discussed lifestyle issues impacting seizure occurrence; sleep deprivation, drugs and alcohol, stress.   -Discussed seizure safety including driving, bathing, swimming and climbing. Discussed patient's obligation to notify the state 's license bureau, and the restrictions now placed on the 's license. It was impressed that it is the patient's responsibility to report this event and driving during this time will render auto insurance invalid.   -We reviewed [place] regulations on seizures and driving with the patient. He appeared to clearly understand that he is prohibited from operating a motor vehicle within 3 months following any seizure or other episode with sudden unconsciousness or inability to sit up, and that he is required to report any future such seizure to the [facility] within 30 days after the event. I also recommended that he and his family review all of his other activities, and avoid any activities that might lead to self-injury or injury of others, within 6 months following any seizure with impaired awareness or impaired motor control. Such activities include but are not limited to holding babies or young children at heights from which they might be injured if dropped, bathing infants or young children in situations in which they might drown without continuous interactive care by a an adult who is fully capable at all times during the bath, operating power cutting or other tools, handling firearms, exposure to  heights from which he might fall, exposure to vessels with hot cooking oil or water, and swimming alone.        -     Comp. Metabolic Panel (14) ([facility]); Future    Major depression in complete remission (H)  Spoke at length about mood disorders including suspected pathophysiology, role of neurotransmitters, and treatment options including medication options ( especially SSRIs that treat cause of sx) and counselling.    Benign essential hypertension  Discussed current hypertension treatment guidelines, including indications for treatment and treatment options.  Discussed the importance for aggressive management of HTN to prevent vascular complications later.  Recommended lower fat, lower carbohydrate, and lower sodium (<2000 mg)diet.  Discussed required intervals for follow up on HTN, lab studies.  Recommened pt. follow their blood pressures outside the clinic to ensure that BPs are remaining within guidelines, and to contact me if the readings are not within guidelines on a regular basis so we can adjust treatment as needed.    -     Lipid Panel ([facility]); Future  -     CBC with Diff/Plt ([facility]); Future  -     lisinopril (ZESTRIL) 20 MG tablet; Take 1 tablet (20 mg) by mouth daily    Astrocytoma brain tumor (H)  Due for MRI follow in November    Screening for prostate cancer  -     PSA Serum ([facility]); Future    Moderate single current episode of major depressive disorder (H)  -     sertraline (ZOLOFT) 100 MG tablet; TAKE 1 TABLET(100 MG) BY MOUTH DAILY. START WITH A HALF PILL FOR THE FIRST WEEK        See Patient Instructions  There are no Patient Instructions on file for this visit.    The following health maintenance items are reviewed in Epic and correct as of today:  Health Maintenance   Topic Date Due     PREVENTIVE CARE VISIT  1970     HIV SCREENING  12/31/1983     ZOSTER IMMUNIZATION (1 of 2) 12/31/2018     PHQ-9  02/22/2020     INFLUENZA VACCINE (1) 09/01/2020     COLORECTAL CANCER SCREENING   04/24/2021     LIPID  05/09/2021     DTAP/TDAP/TD IMMUNIZATION (2 - Td) 05/26/2026     DEPRESSION ACTION PLAN  Completed     IPV IMMUNIZATION  Aged Out     MENINGITIS IMMUNIZATION  Aged Out     HEPATITIS B IMMUNIZATION  Aged Out       Video-Visit Details    This visit is being conducted as a virtual visit due to the emphasis on mitigation of the COVID-19 virus pandemic.   Type of service:  Video Visit    Originating Location (pt. Location): Home    Distant Location (provider location):  [facility]     Platform used for Video Visit: Ester Juarez MD  [facility]  Family Practice  [facility]  [phone]    Video End Time:4:26 PM  For any issues my office # is [phone]

## 2020-09-18 DIAGNOSIS — I10 BENIGN ESSENTIAL HYPERTENSION: ICD-10-CM

## 2020-09-18 DIAGNOSIS — Z12.5 SCREENING FOR PROSTATE CANCER: ICD-10-CM

## 2020-09-18 DIAGNOSIS — G40.909 SEIZURE DISORDER (H): ICD-10-CM

## 2020-09-18 LAB
% GRANULOCYTES: 68.8 % (ref 42.2–75.2)
HCT VFR BLD AUTO: 43 % (ref 39–51)
HEMOGLOBIN: 14.5 G/DL (ref 13.4–17.5)
LYMPHOCYTES NFR BLD AUTO: 25 % (ref 20.5–51.1)
MCH RBC QN AUTO: 28.3 PG (ref 27–31)
MCHC RBC AUTO-ENTMCNC: 33.9 G/DL (ref 33–37)
MCV RBC AUTO: 83.5 FL (ref 80–100)
MONOCYTES NFR BLD AUTO: 6.2 % (ref 1.7–9.3)
PLATELET # BLD AUTO: 266 K/UL (ref 140–450)
RBC # BLD AUTO: 5.14 X10/CMM (ref 4.2–5.9)
WBC # BLD AUTO: 5.8 X10/CMM (ref 3.8–11)

## 2020-09-18 PROCEDURE — 90686 IIV4 VACC NO PRSV 0.5 ML IM: CPT | Performed by: FAMILY MEDICINE

## 2020-09-18 PROCEDURE — 85025 COMPLETE CBC W/AUTO DIFF WBC: CPT | Performed by: FAMILY MEDICINE

## 2020-09-18 PROCEDURE — 36415 COLL VENOUS BLD VENIPUNCTURE: CPT | Performed by: FAMILY MEDICINE

## 2020-09-18 PROCEDURE — 90471 IMMUNIZATION ADMIN: CPT | Performed by: FAMILY MEDICINE

## 2020-09-19 LAB
ALBUMIN SERPL-MCNC: 4.3 G/DL (ref 3.8–4.9)
ALBUMIN/GLOB SERPL: 2.2 {RATIO} (ref 1.2–2.2)
ALP SERPL-CCNC: 54 IU/L (ref 39–117)
ALT SERPL-CCNC: 7 IU/L (ref 0–44)
AST SERPL-CCNC: 9 IU/L (ref 0–40)
BILIRUB SERPL-MCNC: 0.7 MG/DL (ref 0–1.2)
BUN SERPL-MCNC: 12 MG/DL (ref 6–24)
BUN/CREATININE RATIO: 13 (ref 9–20)
CALCIUM SERPL-MCNC: 9.1 MG/DL (ref 8.7–10.2)
CHLORIDE SERPLBLD-SCNC: 96 MMOL/L (ref 96–106)
CHOLEST SERPL-MCNC: 233 MG/DL (ref 100–199)
CREAT SERPL-MCNC: 0.92 MG/DL (ref 0.76–1.27)
EGFR IF AFRICN AM: 111 ML/MIN/1.73
EGFR IF NONAFRICN AM: 96 ML/MIN/1.73
GLOBULIN, TOTAL: 2 G/DL (ref 1.5–4.5)
GLUCOSE SERPL-MCNC: 78 MG/DL (ref 65–99)
HDLC SERPL-MCNC: 49 MG/DL
LDL/HDL RATIO: 3.5 RATIO (ref 0–3.6)
LDLC SERPL CALC-MCNC: 171 MG/DL (ref 0–99)
POTASSIUM SERPL-SCNC: 4.2 MMOL/L (ref 3.5–5.2)
PROT SERPL-MCNC: 6.3 G/DL (ref 6–8.5)
PSA NG/ML: 4 NG/ML (ref 0–4)
SODIUM SERPL-SCNC: 135 MMOL/L (ref 134–144)
TOTAL CO2: 29 MMOL/L (ref 20–29)
TRIGL SERPL-MCNC: 76 MG/DL (ref 0–149)
VLDLC SERPL CALC-MCNC: 13 MG/DL (ref 5–40)

## 2020-09-23 ENCOUNTER — TELEPHONE (OUTPATIENT)
Dept: FAMILY MEDICINE | Facility: CLINIC | Age: 50
End: 2020-09-23

## 2020-09-23 DIAGNOSIS — E78.00 ELEVATED LDL CHOLESTEROL LEVEL: Primary | ICD-10-CM

## 2020-09-23 NOTE — TELEPHONE ENCOUNTER
Called and spoke with patient regarding results. He had viewed results via [employer], he wishes to work on diet and exercise and then recheck lipids in 3 months, future lab orders entered. Cinthya Cunha

## 2020-09-23 NOTE — TELEPHONE ENCOUNTER
----- Message from Claudy Juarez MD sent at 9/22/2020  9:42 AM CDT -----  Can you review his elevated LDL and see if he is following a good diet or should start crestor 5 mg daily.  KN

## 2020-11-06 ENCOUNTER — APPOINTMENT (OUTPATIENT)
Dept: CT IMAGING | Facility: CLINIC | Age: 50
End: 2020-11-06
Attending: EMERGENCY MEDICINE
Payer: COMMERCIAL

## 2020-11-06 ENCOUNTER — HOSPITAL ENCOUNTER (EMERGENCY)
Facility: CLINIC | Age: 50
Discharge: HOME | End: 2020-11-06
Attending: EMERGENCY MEDICINE | Admitting: EMERGENCY MEDICINE
Payer: COMMERCIAL

## 2020-11-06 VITALS
TEMPERATURE: 98.3 F | RESPIRATION RATE: 18 BRPM | HEART RATE: 99 BPM | SYSTOLIC BLOOD PRESSURE: 122 MMHG | OXYGEN SATURATION: 96 % | DIASTOLIC BLOOD PRESSURE: 84 MMHG

## 2020-11-06 DIAGNOSIS — G40.909 RECURRENT SEIZURES (H): ICD-10-CM

## 2020-11-06 DIAGNOSIS — Z87.898 HISTORY OF BRAIN TUMOR: ICD-10-CM

## 2020-11-06 LAB
ANION GAP SERPL CALCULATED.3IONS-SCNC: 5 MMOL/L (ref 3–14)
BASOPHILS # BLD AUTO: 0 10E9/L (ref 0–0.2)
BASOPHILS NFR BLD AUTO: 0.4 %
BUN SERPL-MCNC: 14 MG/DL (ref 7–30)
CALCIUM SERPL-MCNC: 8.8 MG/DL (ref 8.5–10.1)
CHLORIDE SERPL-SCNC: 102 MMOL/L (ref 94–109)
CO2 SERPL-SCNC: 30 MMOL/L (ref 20–32)
CREAT SERPL-MCNC: 0.86 MG/DL (ref 0.66–1.25)
DIFFERENTIAL METHOD BLD: NORMAL
EOSINOPHIL # BLD AUTO: 0.2 10E9/L (ref 0–0.7)
EOSINOPHIL NFR BLD AUTO: 3 %
ERYTHROCYTE [DISTWIDTH] IN BLOOD BY AUTOMATED COUNT: 12.6 % (ref 10–15)
GFR SERPL CREATININE-BSD FRML MDRD: >90 ML/MIN/{1.73_M2}
GLUCOSE SERPL-MCNC: 169 MG/DL (ref 70–99)
HCT VFR BLD AUTO: 42.9 % (ref 40–53)
HGB BLD-MCNC: 15 G/DL (ref 13.3–17.7)
IMM GRANULOCYTES # BLD: 0 10E9/L (ref 0–0.4)
IMM GRANULOCYTES NFR BLD: 0.4 %
LYMPHOCYTES # BLD AUTO: 2 10E9/L (ref 0.8–5.3)
LYMPHOCYTES NFR BLD AUTO: 30.2 %
MCH RBC QN AUTO: 29.8 PG (ref 26.5–33)
MCHC RBC AUTO-ENTMCNC: 35 G/DL (ref 31.5–36.5)
MCV RBC AUTO: 85 FL (ref 78–100)
MONOCYTES # BLD AUTO: 0.6 10E9/L (ref 0–1.3)
MONOCYTES NFR BLD AUTO: 8.7 %
NEUTROPHILS # BLD AUTO: 3.8 10E9/L (ref 1.6–8.3)
NEUTROPHILS NFR BLD AUTO: 57.3 %
NRBC # BLD AUTO: 0 10*3/UL
NRBC BLD AUTO-RTO: 0 /100
PLATELET # BLD AUTO: 293 10E9/L (ref 150–450)
POTASSIUM SERPL-SCNC: 3.4 MMOL/L (ref 3.4–5.3)
RBC # BLD AUTO: 5.04 10E12/L (ref 4.4–5.9)
SODIUM SERPL-SCNC: 137 MMOL/L (ref 133–144)
WBC # BLD AUTO: 6.7 10E9/L (ref 4–11)

## 2020-11-06 PROCEDURE — 85025 COMPLETE CBC W/AUTO DIFF WBC: CPT | Performed by: EMERGENCY MEDICINE

## 2020-11-06 PROCEDURE — 99284 EMERGENCY DEPT VISIT MOD MDM: CPT | Mod: 25

## 2020-11-06 PROCEDURE — 70450 CT HEAD/BRAIN W/O DYE: CPT

## 2020-11-06 PROCEDURE — 80177 DRUG SCRN QUAN LEVETIRACETAM: CPT | Performed by: EMERGENCY MEDICINE

## 2020-11-06 PROCEDURE — 80048 BASIC METABOLIC PNL TOTAL CA: CPT | Performed by: EMERGENCY MEDICINE

## 2020-11-06 ASSESSMENT — ENCOUNTER SYMPTOMS
SEIZURES: 1
HEADACHES: 0
DIARRHEA: 0
VOMITING: 0

## 2020-11-06 NOTE — ED AVS SNAPSHOT
[facility] Emergency Dept  [address] [address]  Phone: [phone]  Fax: [phone]                                    Nhan Bryan   MRN: [id number]    Department: [facility] Emergency Dept   Date of Visit: 11/6/2020           After Visit Summary Signature Page    I have received my discharge instructions, and my questions have been answered. I have discussed any challenges I see with this plan with the nurse or doctor.    ..........................................................................................................................................  Patient/Patient Representative Signature      ..........................................................................................................................................  Patient Representative Print Name and Relationship to Patient    ..................................................               ................................................  Date                                   Time    ..........................................................................................................................................  Reviewed by Signature/Title    ...................................................              ..............................................  Date                                               Time          22EPIC Rev 08/18

## 2020-11-07 NOTE — ED NOTES
"Bed: ED07  Expected date: 11/6/20  Expected time: 7:26 PM  Means of arrival:   Comments:  518  49M A/O x4, Seizure  \"No covid concerns\" 2115 "

## 2020-11-07 NOTE — ED TRIAGE NOTES
Brother heard crash and moan. Brother found him having a full body seizure that lasted approx 1 minute. Hx of brain tumor. Has hx of seizures but not one for approx 4-5 months. EMS report he was initially post-ictal combative for approx 40 minutes and then suddenly just cleared.

## 2020-11-07 NOTE — ED PROVIDER NOTES
History     Chief Complaint:  Seizures       HPI  Nhan Bryan is a 49 year old year old male with a history of astrocytoma brain tumor, neurofibromatosis, and epilepsy who presents for evaluation of seizures. The patient had a seizure this evening, and his brother heard him from the other room and stated that it lasted about a minute. He does have a history of seizures with his last one in May and he thinks this is one of his worst one in a while. He thinks he maybe missed one dose of his Keppra at night recently but other than that has been overall pretty consistent. His brain tumor is 1 cm on his temporal lobe and is non cancerous, and it has not grown since the diagnosis in 2002. He does have an MRI with his neurologist on November 21. He does not come to the hospital for every seizure but his brother thought this one was particularly scary and called an ambulance. The patient denies biting his tongue, soiling himself, vomiting, diarrhea, headaches, or vision changes.  EMS reported post-ictal period of 30-40 min.      Allergies:  No Known Drug Allergies      Medications:   Keppra  Lisinopril  Sertraline      Medical History:   Astrocytoma brain tumor  Depressive disorder  Epilepsy  Hypertension  Neurofibromatosis, peripheral      Surgical History:   Colonoscopy  External ear surgery      Family History:   Colorectal cancer      Social History:  Smoking Status: Former Smoker    Type: Cigarettes    Quit 2002  Smokeless Tobacco: Never Used  Alcohol Use: Negative  Drug Use: Negative  Primary Care: Claudy Juarez       Review of Systems   Eyes: Negative for visual disturbance.   Gastrointestinal: Negative for diarrhea and vomiting.   Neurological: Positive for seizures. Negative for headaches.   All other systems reviewed and are negative.    Physical Exam     Patient Vitals for the past 24 hrs:   BP Temp Temp src Pulse Resp SpO2   11/06/20 2126 (!) 127/95 98.3  F (36.8  C) Oral 104 18 95 %           Physical Exam    Eyes:               Sclera white; Pupils are equal and round  ENT:                External ears and nares normal  CV:                  Rate as above with regular rhythm   Resp:               Breath sounds clear and equal bilaterally  GI:                   Abdomen is soft, non-tender, non-distended  MS:                  Moves all extremities  Skin:                Warm and dry, numerous bumps of varying sizes across body  Neuro:             Speech is normal and fluent. No apparent deficit.    Emergency Department Course     Imaging:  Radiology results were communicated with the patient who voiced understanding of the findings.    Head CT w/o contrast  1.  No acute intracranial pathology. Intracranial contents unremarkable.    Reading per radiology     Laboratory:  Laboratory findings were communicated with the patient who voiced understanding of the findings.    Keppra Level: pending    CBC: WBC 6.7, HGB 15.0,   BMP: Glucose 169 (H) (Creatinine 0.86) o/w WNL     Procedures:        Emergency Department Course:    2127 IV was inserted and blood was drawn for laboratory testing, results above.    2130 Nursing notes and vitals reviewed.    2134 I performed an exam of the patient as documented above.     2202 The patient was sent for CT while in the emergency department, results above.     2303 Findings and plan explained to the Patient. Patient discharged home with instructions regarding supportive care, medications, and reasons to return. The importance of close follow-up was reviewed. The patient was prescribed as below.    Impression & Plan     Medical Decision Making:    Nhan Bryan is a 49 year old male who presents for evaluation of seizure. He did not sustain any injuries during this and now is back at his baseline after a post-ictal period reported by EMS. He is on Keppra and level was sent today. There is no evidence for severe electrolyte derangements that may have  contributed to this such as a low sodium or low glucose. CT scan of the head does not show any extensive areas of edema or hemorrhage which may have triggered this. He has a baseline history of seizures and can follow up with his neurologist.      Diagnosis:     ICD-10-CM    1. Recurrent seizures (H)  G40.909    2. History of brain tumor  Z87.898         Disposition:  Discharged to home.    Discharge Medications:  New Prescriptions    No medications on file       Scribe Disclosure:  Juju LOPEZ, am serving as a scribe at 9:32 PM on 11/6/2020 to document services personally performed by Mirna Painting MD based on my observations and the provider's statements to me.      Mirna Painting MD  11/07/20 0131

## 2020-11-08 LAB — LEVETIRACETAM SERPL-MCNC: 46 UG/ML (ref 12–46)

## 2020-11-16 ENCOUNTER — HEALTH MAINTENANCE LETTER (OUTPATIENT)
Age: 50
End: 2020-11-16

## 2020-11-17 ENCOUNTER — TRANSFERRED RECORDS (OUTPATIENT)
Dept: FAMILY MEDICINE | Facility: CLINIC | Age: 50
End: 2020-11-17

## 2020-12-15 ENCOUNTER — TRANSFERRED RECORDS (OUTPATIENT)
Dept: FAMILY MEDICINE | Facility: CLINIC | Age: 50
End: 2020-12-15

## 2020-12-15 VITALS — TEMPERATURE: 95.1 F

## 2020-12-15 DIAGNOSIS — E78.00 ELEVATED LDL CHOLESTEROL LEVEL: ICD-10-CM

## 2020-12-15 PROCEDURE — 36415 COLL VENOUS BLD VENIPUNCTURE: CPT | Performed by: FAMILY MEDICINE

## 2020-12-16 LAB
CHOLEST SERPL-MCNC: 236 MG/DL (ref 100–199)
HDLC SERPL-MCNC: 57 MG/DL
LDL/HDL RATIO: 2.9 RATIO (ref 0–3.6)
LDLC SERPL CALC-MCNC: 164 MG/DL (ref 0–99)
TRIGL SERPL-MCNC: 84 MG/DL (ref 0–149)
VLDLC SERPL CALC-MCNC: 15 MG/DL (ref 5–40)

## 2020-12-17 NOTE — RESULT ENCOUNTER NOTE
Dear Nhan,   I am writing to report that your included test results show some improvement.  Let's recheck in 6 months.    Claudy Juarez M.D.

## 2021-03-08 ENCOUNTER — TRANSFERRED RECORDS (OUTPATIENT)
Dept: FAMILY MEDICINE | Facility: CLINIC | Age: 51
End: 2021-03-08

## 2021-09-18 ENCOUNTER — HEALTH MAINTENANCE LETTER (OUTPATIENT)
Age: 51
End: 2021-09-18

## 2021-09-21 ENCOUNTER — OFFICE VISIT (OUTPATIENT)
Dept: FAMILY MEDICINE | Facility: CLINIC | Age: 51
End: 2021-09-21

## 2021-09-21 VITALS
BODY MASS INDEX: 22.31 KG/M2 | SYSTOLIC BLOOD PRESSURE: 128 MMHG | DIASTOLIC BLOOD PRESSURE: 84 MMHG | WEIGHT: 138.8 LBS | OXYGEN SATURATION: 97 % | HEART RATE: 83 BPM | HEIGHT: 66 IN

## 2021-09-21 DIAGNOSIS — Z13.6 SCREENING FOR HEART DISEASE: ICD-10-CM

## 2021-09-21 DIAGNOSIS — E55.9 VITAMIN D DEFICIENCY: ICD-10-CM

## 2021-09-21 DIAGNOSIS — Z86.0100 HISTORY OF COLONIC POLYPS: ICD-10-CM

## 2021-09-21 DIAGNOSIS — I10 BENIGN ESSENTIAL HYPERTENSION: Primary | ICD-10-CM

## 2021-09-21 DIAGNOSIS — F32.1 MODERATE SINGLE CURRENT EPISODE OF MAJOR DEPRESSIVE DISORDER (H): ICD-10-CM

## 2021-09-21 DIAGNOSIS — Z12.5 SCREENING FOR PROSTATE CANCER: ICD-10-CM

## 2021-09-21 DIAGNOSIS — G40.909 SEIZURE DISORDER (H): ICD-10-CM

## 2021-09-21 LAB
% GRANULOCYTES: 84.4 % (ref 42.2–75.2)
HCT VFR BLD AUTO: 42.3 % (ref 39–51)
HEMOGLOBIN: 15.1 G/DL (ref 13.4–17.5)
LYMPHOCYTES NFR BLD AUTO: 11.8 % (ref 20.5–51.1)
MCH RBC QN AUTO: 29.1 PG (ref 27–31)
MCHC RBC AUTO-ENTMCNC: 35.7 G/DL (ref 33–37)
MCV RBC AUTO: 81.6 FL (ref 80–100)
MONOCYTES NFR BLD AUTO: 3.8 % (ref 1.7–9.3)
PLATELET # BLD AUTO: 277 K/UL (ref 140–450)
RBC # BLD AUTO: 5.18 X10/CMM (ref 4.2–5.9)
WBC # BLD AUTO: 7.3 X10/CMM (ref 3.8–11)

## 2021-09-21 PROCEDURE — 36415 COLL VENOUS BLD VENIPUNCTURE: CPT | Performed by: FAMILY MEDICINE

## 2021-09-21 PROCEDURE — 99214 OFFICE O/P EST MOD 30 MIN: CPT | Performed by: FAMILY MEDICINE

## 2021-09-21 PROCEDURE — 85025 COMPLETE CBC W/AUTO DIFF WBC: CPT | Performed by: FAMILY MEDICINE

## 2021-09-21 RX ORDER — SERTRALINE HYDROCHLORIDE 100 MG/1
TABLET, FILM COATED ORAL
Qty: 90 TABLET | Refills: 3 | Status: SHIPPED | OUTPATIENT
Start: 2021-09-21 | End: 2022-09-09

## 2021-09-21 RX ORDER — LISINOPRIL 20 MG/1
20 TABLET ORAL DAILY
Qty: 90 TABLET | Refills: 3 | Status: SHIPPED | OUTPATIENT
Start: 2021-09-21 | End: 2022-09-09

## 2021-09-21 RX ORDER — LACOSAMIDE 50 MG
TABLET ORAL
COMMUNITY
Start: 2021-06-21

## 2021-09-21 ASSESSMENT — ANXIETY QUESTIONNAIRES
5. BEING SO RESTLESS THAT IT IS HARD TO SIT STILL: SEVERAL DAYS
2. NOT BEING ABLE TO STOP OR CONTROL WORRYING: SEVERAL DAYS
GAD7 TOTAL SCORE: 8
IF YOU CHECKED OFF ANY PROBLEMS ON THIS QUESTIONNAIRE, HOW DIFFICULT HAVE THESE PROBLEMS MADE IT FOR YOU TO DO YOUR WORK, TAKE CARE OF THINGS AT HOME, OR GET ALONG WITH OTHER PEOPLE: SOMEWHAT DIFFICULT
1. FEELING NERVOUS, ANXIOUS, OR ON EDGE: SEVERAL DAYS
7. FEELING AFRAID AS IF SOMETHING AWFUL MIGHT HAPPEN: NOT AT ALL
6. BECOMING EASILY ANNOYED OR IRRITABLE: MORE THAN HALF THE DAYS
3. WORRYING TOO MUCH ABOUT DIFFERENT THINGS: MORE THAN HALF THE DAYS

## 2021-09-21 ASSESSMENT — PATIENT HEALTH QUESTIONNAIRE - PHQ9
SUM OF ALL RESPONSES TO PHQ QUESTIONS 1-9: 13
5. POOR APPETITE OR OVEREATING: SEVERAL DAYS

## 2021-09-21 ASSESSMENT — MIFFLIN-ST. JEOR: SCORE: 1414.4

## 2021-09-21 NOTE — PATIENT INSTRUCTIONS
"Thank you for coming in today!   If you receive a survey via My Chart, mail or phone, please let us know if there was anything you especially appreciated today or if there is any way we can improve our clinic. We value your input.     Likewise, we are working hard to attend to our digital reputation.    Please consider reviewing our clinic on Google and/or Facebook via the following links:    [email or web address]    We truly appreciate you taking the time to do this!    General Information:    Today you had your appointment with Caludy Juarez MD    I am in the clinic:  Monday and Friday mornings  Tuesday and Thursday afternoons    I am not in the office Wednesdays, non urgent calls received on Wednesday will be addressed when I am back in the office on Thursday.    If lab work was done today as part of your evaluation you will generally be contacted via My Chart, mail, or phone with the results within 1-5 days. If there is an alarming result we will contact you by phone. Lab results come back at varying times, I generally wait until all labs are resulted before making comments on results. Please note labs are automatically released to My Chart once available.    If you need refills please contact your pharmacist. They will send a refill request to me to review. Please allow 3 business days for us to process all refill requests.    Please call or send a medical message with any questions or concerns.    Thank you for choosing [facility].  We truly appreciate you trusting us with your medical care.    Your next visit with us should be scheduled for Follow up in 1 year.     Listed next are the medical health Maintenance items we are tracking for your care and when they are next due (or overdue!)  Our goal is 100% \"up to date.\" Keep this list handy to call and schedule what you are due for in the future!    Health " Maintenance Due   Topic Date Due     PREVENTIVE CARE VISIT  Never done     ADVANCE CARE PLANNING  Never done     HIV SCREENING  Never done     ZOSTER IMMUNIZATION (1 of 2) Never done     COLORECTAL CANCER SCREENING  04/24/2021     INFLUENZA VACCINE (1) 09/01/2021       Sincerely,    Claudy Juarez MD

## 2021-09-21 NOTE — PROGRESS NOTES
Depression:  Has known history of depression.  Has been on medication for this.  The patient does not report any significant side effects of this medication.  The prior symptoms leading to the original diagnosis and decision to start medication therapy are better.     Appetite is stable.  Sleeping patterns are stable.  No reported thoughts of suicide or homicide.    Last 3 PHQ9 results:  PHQ-9 SCORE 8/9/2018 8/22/2019 9/15/2020 9/21/2021   PHQ-9 Total Score 4 3 1 13     Seeing neurology tomorrow.  Problem(s) Oriented visit      ROS:  General and Resp. completed and negative except as noted above     HISTORY:   reports no history of alcohol use.   reports that he quit smoking about 19 years ago. His smoking use included cigarettes. He has never used smokeless tobacco.    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    EXAM:  APPEARANCE: = Relaxed and in no distress  Conj/Eyelids = noninjected and lids and lashes are without inflammation  PERRLA/Irises = Pupils Round Reactive to Light and Irisis without inflammation  Neck = No anterior or posterior adenopathy appreciated.  Thyroid = Not enlarged and no masses felt  Resp effort = Calm regular breathing  Breath Sounds = Good air movement with no rales or rhonchi in any lung fields  Heart Rate, Rythym, & sounds (no Murm)  =  "Regular rate and rythym with no S3, S4, or murmer appreciated.  Carotid Art's = Pulses full and equal and no bruits appreciated  Abdomen = Soft, nontender, no masses, & bowel sounds in all quadrants  Liver/Spleen = Normal span and no splenomegaly noted  Digits and Nails = FROM in all finger joints, no nail dystrophy  Ext (edema) = No pretibial edema noted or elsewhere  Musculsktl =  Strength and ROM of major joints are within normal limits  SKIN = absent significant rashes or lesions   Recent/Remote Memory = Alert and Oriented x 3  Mood/Affect = Cooperative and interested      Assessment/Plan:  Nhan was seen today for hypertension, depression and blood draw.    Diagnoses and all orders for this visit:    Benign essential hypertension  -     lisinopril (ZESTRIL) 20 MG tablet; Take 1 tablet (20 mg) by mouth daily    Moderate single current episode of major depressive disorder (H)  -     sertraline (ZOLOFT) 100 MG tablet; TAKE 1 TABLET(100 MG) BY MOUTH DAILY. START WITH A HALF PILL FOR THE FIRST WEEK    Seizure disorder (H)    Screening for heart disease  -     Lipid Panel ([facility])  -     Comp. Metabolic Panel (14) ([facility])    Screening for prostate cancer  -     PSA Serum ([facility])    History of colonic polyps  -     Colorectal Surgery Referral; Future        COUNSELING:   reports that he quit smoking about 19 years ago. His smoking use included cigarettes. He has never used smokeless tobacco.    Estimated body mass index is 22.75 kg/m  as calculated from the following:    Height as of this encounter: 1.664 m (5' 5.5\").    Weight as of this encounter: 63 kg (138 lb 12.8 oz).       Appropriate preventive services were discussed with this patient, including applicable screening as appropriate for cardiovascular disease, diabetes, osteopenia/osteoporosis, and glaucoma.  As appropriate for age/gender, discussed screening for colorectal cancer, prostate cancer, breast cancer, and cervical cancer. Checklist " reviewing preventive services available has been given to the patient.    Reviewed patients plan of care and provided an AVS. The Basic Care Plan (routine screening as documented in Health Maintenance) for Nhan meets the Care Plan requirement. This Care Plan has been established and reviewed with the  Patient2.      The following health maintenance items are reviewed in Epic and correct as of today:  Health Maintenance   Topic Date Due     PREVENTIVE CARE VISIT  Never done     ADVANCE CARE PLANNING  Never done     HIV SCREENING  Never done     ZOSTER IMMUNIZATION (1 of 2) Never done     COLORECTAL CANCER SCREENING  04/24/2021     INFLUENZA VACCINE (1) 09/01/2021     PHQ-9  03/21/2022     LIPID  12/15/2025     DTAP/TDAP/TD IMMUNIZATION (2 - Td or Tdap) 05/26/2026     DEPRESSION ACTION PLAN  Completed     COVID-19 Vaccine  Completed     Pneumococcal Vaccine: Pediatrics (0 to 5 Years) and At-Risk Patients (6 to 64 Years)  Aged Out     IPV IMMUNIZATION  Aged Out     MENINGITIS IMMUNIZATION  Aged Out     HEPATITIS B IMMUNIZATION  Aged Out     HEPATITIS C SCREENING  Discontinued       Claudy Juarez  [facility]  For any issues my office # is [phone]

## 2021-09-22 LAB
ALBUMIN SERPL-MCNC: 4.5 G/DL (ref 3.8–4.9)
ALBUMIN/GLOB SERPL: 1.8 {RATIO} (ref 1.2–2.2)
ALP SERPL-CCNC: 69 IU/L (ref 44–121)
ALT SERPL-CCNC: 6 IU/L (ref 0–44)
AST SERPL-CCNC: 11 IU/L (ref 0–40)
BILIRUB SERPL-MCNC: 0.7 MG/DL (ref 0–1.2)
BUN SERPL-MCNC: 10 MG/DL (ref 6–24)
BUN/CREATININE RATIO: 13 (ref 9–20)
CALCIUM SERPL-MCNC: 9.4 MG/DL (ref 8.7–10.2)
CHLORIDE SERPLBLD-SCNC: 98 MMOL/L (ref 96–106)
CHOLEST SERPL-MCNC: 219 MG/DL (ref 100–199)
CREAT SERPL-MCNC: 0.8 MG/DL (ref 0.76–1.27)
EGFR IF AFRICN AM: 119 ML/MIN/1.73
EGFR IF NONAFRICN AM: 103 ML/MIN/1.73
GLOBULIN, TOTAL: 2.5 G/DL (ref 1.5–4.5)
GLUCOSE SERPL-MCNC: 111 MG/DL (ref 65–99)
HDLC SERPL-MCNC: 48 MG/DL
LDL/HDL RATIO: 3.2 RATIO (ref 0–3.6)
LDLC SERPL CALC-MCNC: 153 MG/DL (ref 0–99)
POTASSIUM SERPL-SCNC: 4.4 MMOL/L (ref 3.5–5.2)
PROT SERPL-MCNC: 7 G/DL (ref 6–8.5)
PSA NG/ML: 7.5 NG/ML (ref 0–4)
SODIUM SERPL-SCNC: 136 MMOL/L (ref 134–144)
TOTAL CO2: 23 MMOL/L (ref 20–29)
TRIGL SERPL-MCNC: 98 MG/DL (ref 0–149)
VITAMIN D, 25-HYDROXY: 40.4 NG/ML (ref 30–100)
VLDLC SERPL CALC-MCNC: 18 MG/DL (ref 5–40)

## 2021-09-22 ASSESSMENT — ANXIETY QUESTIONNAIRES: GAD7 TOTAL SCORE: 8

## 2021-09-30 ENCOUNTER — OFFICE VISIT (OUTPATIENT)
Dept: FAMILY MEDICINE | Facility: CLINIC | Age: 51
End: 2021-09-30

## 2021-09-30 VITALS
HEART RATE: 71 BPM | DIASTOLIC BLOOD PRESSURE: 74 MMHG | HEIGHT: 66 IN | OXYGEN SATURATION: 98 % | RESPIRATION RATE: 16 BRPM | WEIGHT: 138 LBS | BODY MASS INDEX: 22.18 KG/M2 | SYSTOLIC BLOOD PRESSURE: 122 MMHG

## 2021-09-30 DIAGNOSIS — R97.20 ELEVATED PROSTATE SPECIFIC ANTIGEN (PSA): ICD-10-CM

## 2021-09-30 DIAGNOSIS — G47.9 SLEEP DISORDER: Primary | ICD-10-CM

## 2021-09-30 DIAGNOSIS — Z12.11 SCREENING FOR COLON CANCER: ICD-10-CM

## 2021-09-30 PROCEDURE — 99214 OFFICE O/P EST MOD 30 MIN: CPT | Performed by: FAMILY MEDICINE

## 2021-09-30 RX ORDER — ZOLPIDEM TARTRATE 5 MG/1
5 TABLET ORAL
Qty: 14 TABLET | Refills: 1 | Status: SHIPPED | OUTPATIENT
Start: 2021-09-30 | End: 2022-09-26

## 2021-09-30 ASSESSMENT — ANXIETY QUESTIONNAIRES
1. FEELING NERVOUS, ANXIOUS, OR ON EDGE: SEVERAL DAYS
GAD7 TOTAL SCORE: 6
2. NOT BEING ABLE TO STOP OR CONTROL WORRYING: MORE THAN HALF THE DAYS
6. BECOMING EASILY ANNOYED OR IRRITABLE: SEVERAL DAYS
5. BEING SO RESTLESS THAT IT IS HARD TO SIT STILL: NOT AT ALL
3. WORRYING TOO MUCH ABOUT DIFFERENT THINGS: MORE THAN HALF THE DAYS
7. FEELING AFRAID AS IF SOMETHING AWFUL MIGHT HAPPEN: NOT AT ALL

## 2021-09-30 ASSESSMENT — MIFFLIN-ST. JEOR: SCORE: 1410.77

## 2021-09-30 ASSESSMENT — PATIENT HEALTH QUESTIONNAIRE - PHQ9
SUM OF ALL RESPONSES TO PHQ QUESTIONS 1-9: 10
5. POOR APPETITE OR OVEREATING: NOT AT ALL

## 2021-09-30 NOTE — LETTER
[place] [id number] [place]  [address]  Phone: [phone]    October 1, 2021      Nhan Bryan  [address] [facility] [address]            Dear Nhan,     Here is a copy of your labs, we will discuss them at your upcoming visit.     Claudy Juarez MD/bmb          Results for orders placed or performed in visit on 09/30/21   PSA Serum ([facility])     Status: Abnormal   Result Value Ref Range    PSA NG/ML 7.0 (H) 0.0 - 4.0 ng/mL    Narrative    Performed at:  01 - [facility] [address], [place], [place]  [id number]  : Shivam Garcia MD, Phone:  [phone]

## 2021-09-30 NOTE — PROGRESS NOTES
Problem(s) Oriented visit        SUBJECTIVE:                                                    Nhan Bryan is a 50 year old male who presents to clinic today for the following health issues :      1. Sleep disorder  Needs medications for sleep that have been used long term. Trials of weaning have been very unsuccessful. Fully understands the desire for medication free sleep and sleep hygiene in general.    - zolpidem (AMBIEN) 5 MG tablet; Take 1 tablet (5 mg) by mouth nightly as needed for sleep  Dispense: 14 tablet; Refill: 1    2. Screening for colon cancer    - Adult Gastro Ref - Procedure Only; Future    3. Elevated prostate specific antigen (PSA)  Was up to 7.5 last week checking to see  - PSA Serum ([facility])       Problem list, Medication list, Allergies, and Medical/Social/Surgical histories reviewed in [facility] and updated as appropriate.   Additional history: as documented    ROS:  General and Resp. completed and negative except as noted above    Histories:   Patient Active Problem List   Diagnosis     Seizure disorder (H)     Astrocytoma brain tumor (H)     Neurofibromatosis, peripheral, NF1 (H)     Health Care Home     Benign essential hypertension     Major depression in complete remission (H)     Past Surgical History:   Procedure Laterality Date     COLONOSCOPY N/A 2018    Procedure: COMBINED COLONOSCOPY, SINGLE OR MULTIPLE BIOPSY/POLYPECTOMY BY BIOPSY;;  Surgeon: Zain Farley MD;  Location:  GI     EXTERNAL EAR SURGERY         Social History     Tobacco Use     Smoking status: Former Smoker     Types: Cigarettes     Quit date: 2002     Years since quittin.7     Smokeless tobacco: Never Used   Substance Use Topics     Alcohol use: No     Alcohol/week: 0.0 standard drinks     Comment: quit      Family History   Problem Relation Age of Onset     Cancer - colorectal Father      Cancer - colorectal Mother            OBJECTIVE:                                                   "  /74   Pulse 71   Resp 16   Ht 1.664 m (5' 5.5\")   Wt 62.6 kg (138 lb)   SpO2 98%   BMI 22.62 kg/m    Body mass index is 22.62 kg/m .   APPEARANCE: = Relaxed and in no distress     ASSESSMENT/PLAN:                                                        Nhan was seen today for anxiety, depression and sleep problem.    Diagnoses and all orders for this visit:    Sleep disorder  -     zolpidem (AMBIEN) 5 MG tablet; Take 1 tablet (5 mg) by mouth nightly as needed for sleep    Screening for colon cancer  -     Adult Gastro Ref - Procedure Only; Future    Elevated prostate specific antigen (PSA)  -     PSA Serum ([facility])        >30 min spent with patient, greater than 50% spent on discussion/education/planning, etc. About The primary encounter diagnosis was Sleep disorder. Diagnoses of Screening for colon cancer and Elevated prostate specific antigen (PSA) were also pertinent to this visit.      There are no Patient Instructions on file for this visit.    The following health maintenance items are reviewed in Epic and correct as of today:  Health Maintenance   Topic Date Due     PREVENTIVE CARE VISIT  Never done     ADVANCE CARE PLANNING  Never done     ZOSTER IMMUNIZATION (1 of 2) Never done     COLORECTAL CANCER SCREENING  04/24/2021     INFLUENZA VACCINE (1) 09/01/2021     PHQ-9  03/30/2022     DTAP/TDAP/TD IMMUNIZATION (2 - Td or Tdap) 05/26/2026     LIPID  09/21/2026     DEPRESSION ACTION PLAN  Completed     COVID-19 Vaccine  Completed     Pneumococcal Vaccine: Pediatrics (0 to 5 Years) and At-Risk Patients (6 to 64 Years)  Aged Out     IPV IMMUNIZATION  Aged Out     MENINGITIS IMMUNIZATION  Aged Out     HEPATITIS B IMMUNIZATION  Aged Out     HEPATITIS C SCREENING  Discontinued     HIV SCREENING  Discontinued       Claudy Juarez MD  [facility]  Family Practice  [facility]  [phone]    For any issues my office # is [phone]        "

## 2021-10-01 LAB — PSA NG/ML: 7 NG/ML (ref 0–4)

## 2021-10-01 ASSESSMENT — ANXIETY QUESTIONNAIRES: GAD7 TOTAL SCORE: 6

## 2021-10-01 NOTE — RESULT ENCOUNTER NOTE
Dear Nhan,  Here is a copy of your labs, we will discuss them at your upcoming visit.  Claudy Juarez MD

## 2021-10-07 DIAGNOSIS — Z11.59 ENCOUNTER FOR SCREENING FOR OTHER VIRAL DISEASES: ICD-10-CM

## 2021-10-19 PROBLEM — F32.9 MAJOR DEPRESSION: Status: ACTIVE | Noted: 2019-08-22

## 2021-10-24 ENCOUNTER — LAB (OUTPATIENT)
Dept: URGENT CARE | Facility: URGENT CARE | Age: 51
End: 2021-10-24
Attending: COLON & RECTAL SURGERY
Payer: COMMERCIAL

## 2021-10-24 DIAGNOSIS — Z11.59 ENCOUNTER FOR SCREENING FOR OTHER VIRAL DISEASES: ICD-10-CM

## 2021-10-24 LAB — SARS-COV-2 RNA RESP QL NAA+PROBE: NEGATIVE

## 2021-10-24 PROCEDURE — U0005 INFEC AGEN DETEC AMPLI PROBE: HCPCS

## 2021-10-24 PROCEDURE — U0003 INFECTIOUS AGENT DETECTION BY NUCLEIC ACID (DNA OR RNA); SEVERE ACUTE RESPIRATORY SYNDROME CORONAVIRUS 2 (SARS-COV-2) (CORONAVIRUS DISEASE [COVID-19]), AMPLIFIED PROBE TECHNIQUE, MAKING USE OF HIGH THROUGHPUT TECHNOLOGIES AS DESCRIBED BY CMS-2020-01-R: HCPCS

## 2021-10-28 ENCOUNTER — HOSPITAL ENCOUNTER (OUTPATIENT)
Facility: CLINIC | Age: 51
Discharge: HOME | End: 2021-10-28
Attending: COLON & RECTAL SURGERY | Admitting: COLON & RECTAL SURGERY
Payer: COMMERCIAL

## 2021-10-28 VITALS
RESPIRATION RATE: 14 BRPM | OXYGEN SATURATION: 99 % | SYSTOLIC BLOOD PRESSURE: 121 MMHG | HEART RATE: 75 BPM | DIASTOLIC BLOOD PRESSURE: 86 MMHG

## 2021-10-28 LAB — COLONOSCOPY: NORMAL

## 2021-10-28 PROCEDURE — 99153 MOD SED SAME PHYS/QHP EA: CPT | Performed by: COLON & RECTAL SURGERY

## 2021-10-28 PROCEDURE — G0500 MOD SEDAT ENDO SERVICE >5YRS: HCPCS | Performed by: COLON & RECTAL SURGERY

## 2021-10-28 PROCEDURE — 45385 COLONOSCOPY W/LESION REMOVAL: CPT | Mod: PT | Performed by: COLON & RECTAL SURGERY

## 2021-10-28 PROCEDURE — 250N000011 HC RX IP 250 OP 636: Performed by: COLON & RECTAL SURGERY

## 2021-10-28 PROCEDURE — 88305 TISSUE EXAM BY PATHOLOGIST: CPT | Mod: TC | Performed by: COLON & RECTAL SURGERY

## 2021-10-28 PROCEDURE — 45380 COLONOSCOPY AND BIOPSY: CPT | Mod: PT,XU | Performed by: COLON & RECTAL SURGERY

## 2021-10-28 RX ORDER — PROCHLORPERAZINE MALEATE 10 MG
10 TABLET ORAL EVERY 6 HOURS PRN
Status: DISCONTINUED | OUTPATIENT
Start: 2021-10-28 | End: 2021-10-28 | Stop reason: HOSPADM

## 2021-10-28 RX ORDER — NALOXONE HYDROCHLORIDE 0.4 MG/ML
0.4 INJECTION, SOLUTION INTRAMUSCULAR; INTRAVENOUS; SUBCUTANEOUS
Status: DISCONTINUED | OUTPATIENT
Start: 2021-10-28 | End: 2021-10-28 | Stop reason: HOSPADM

## 2021-10-28 RX ORDER — FENTANYL CITRATE 50 UG/ML
INJECTION, SOLUTION INTRAMUSCULAR; INTRAVENOUS PRN
Status: COMPLETED | OUTPATIENT
Start: 2021-10-28 | End: 2021-10-28

## 2021-10-28 RX ORDER — FLUMAZENIL 0.1 MG/ML
0.2 INJECTION, SOLUTION INTRAVENOUS
Status: DISCONTINUED | OUTPATIENT
Start: 2021-10-28 | End: 2021-10-28 | Stop reason: HOSPADM

## 2021-10-28 RX ORDER — NALOXONE HYDROCHLORIDE 0.4 MG/ML
0.2 INJECTION, SOLUTION INTRAMUSCULAR; INTRAVENOUS; SUBCUTANEOUS
Status: DISCONTINUED | OUTPATIENT
Start: 2021-10-28 | End: 2021-10-28 | Stop reason: HOSPADM

## 2021-10-28 RX ORDER — ONDANSETRON 4 MG/1
4 TABLET, ORALLY DISINTEGRATING ORAL EVERY 6 HOURS PRN
Status: DISCONTINUED | OUTPATIENT
Start: 2021-10-28 | End: 2021-10-28 | Stop reason: HOSPADM

## 2021-10-28 RX ORDER — ONDANSETRON 2 MG/ML
4 INJECTION INTRAMUSCULAR; INTRAVENOUS EVERY 6 HOURS PRN
Status: DISCONTINUED | OUTPATIENT
Start: 2021-10-28 | End: 2021-10-28 | Stop reason: HOSPADM

## 2021-10-28 RX ADMIN — MIDAZOLAM 2 MG: 1 INJECTION INTRAMUSCULAR; INTRAVENOUS at 10:11

## 2021-10-28 RX ADMIN — FENTANYL CITRATE 100 MCG: 50 INJECTION, SOLUTION INTRAMUSCULAR; INTRAVENOUS at 10:12

## 2021-10-29 LAB
PATH REPORT.COMMENTS IMP SPEC: NORMAL
PATH REPORT.COMMENTS IMP SPEC: NORMAL
PATH REPORT.FINAL DX SPEC: NORMAL
PATH REPORT.GROSS SPEC: NORMAL
PATH REPORT.MICROSCOPIC SPEC OTHER STN: NORMAL
PATH REPORT.RELEVANT HX SPEC: NORMAL
PHOTO IMAGE: NORMAL

## 2021-10-29 PROCEDURE — 88305 TISSUE EXAM BY PATHOLOGIST: CPT | Mod: 26 | Performed by: PATHOLOGY

## 2021-11-01 ENCOUNTER — TELEPHONE (OUTPATIENT)
Dept: FAMILY MEDICINE | Facility: CLINIC | Age: 51
End: 2021-11-01

## 2021-11-01 NOTE — TELEPHONE ENCOUNTER
Called and spoke with patient regarding elevated PSA per Dr. Juarez.  Component PSA NG/ML   Latest Ref Rng & Units 0.0 - 4.0 ng/mL   9/18/2020 4.0   9/21/2021 7.5 (H)   9/30/2021 7.0 (H)   Patient had colonoscopy on 10/28/21 and was noted to have enlarged prostate. Per Dr. Juarez patient should have 4K score done. Patient is scheduled for lab only 11/8/21 for test. Cinthya Cunha

## 2021-11-08 ENCOUNTER — TRANSFERRED RECORDS (OUTPATIENT)
Dept: FAMILY MEDICINE | Facility: CLINIC | Age: 51
End: 2021-11-08

## 2021-11-08 DIAGNOSIS — R97.20 ELEVATED PROSTATE SPECIFIC ANTIGEN (PSA): Primary | ICD-10-CM

## 2021-11-08 PROCEDURE — 36415 COLL VENOUS BLD VENIPUNCTURE: CPT | Performed by: FAMILY MEDICINE

## 2021-11-11 ENCOUNTER — TRANSFERRED RECORDS (OUTPATIENT)
Dept: FAMILY MEDICINE | Facility: CLINIC | Age: 51
End: 2021-11-11

## 2021-12-02 NOTE — TELEPHONE ENCOUNTER
Received results 4K score, 5%. Patient is already schedule to see urology 1/3/22 and wishes to follow up with them. Cinthya Cunha

## 2021-12-09 ENCOUNTER — OFFICE VISIT (OUTPATIENT)
Dept: FAMILY MEDICINE | Facility: CLINIC | Age: 51
End: 2021-12-09

## 2021-12-09 VITALS
DIASTOLIC BLOOD PRESSURE: 68 MMHG | OXYGEN SATURATION: 95 % | BODY MASS INDEX: 22.94 KG/M2 | SYSTOLIC BLOOD PRESSURE: 116 MMHG | WEIGHT: 140 LBS | HEART RATE: 90 BPM

## 2021-12-09 DIAGNOSIS — N40.1 BENIGN PROSTATIC HYPERPLASIA WITH URINARY HESITANCY: ICD-10-CM

## 2021-12-09 DIAGNOSIS — R97.20 ELEVATED PROSTATE SPECIFIC ANTIGEN (PSA): ICD-10-CM

## 2021-12-09 DIAGNOSIS — R39.11 BENIGN PROSTATIC HYPERPLASIA WITH URINARY HESITANCY: ICD-10-CM

## 2021-12-09 DIAGNOSIS — F33.42 RECURRENT MAJOR DEPRESSIVE DISORDER, IN FULL REMISSION (H): Primary | ICD-10-CM

## 2021-12-09 PROCEDURE — 99213 OFFICE O/P EST LOW 20 MIN: CPT | Performed by: FAMILY MEDICINE

## 2021-12-09 ASSESSMENT — PATIENT HEALTH QUESTIONNAIRE - PHQ9: SUM OF ALL RESPONSES TO PHQ QUESTIONS 1-9: 2

## 2021-12-09 NOTE — PROGRESS NOTES
Depression:  Has known history of depression.  Has been on medication for this.  The patient does not report any significant side effects of this medication.  The prior symptoms leading to the original diagnosis and decision to start medication therapy are better.     Appetite is stable.  Sleeping patterns are stable.  No reported thoughts of suicide or homicide.    Last 3 PHQ9 results:  PHQ-9 SCORE 9/15/2020 9/21/2021 9/30/2021 12/9/2021   PHQ-9 Total Score 1 13 10 2     The increase in sertraline was helpful!  Sleep issues resolved.    K4 score is low  cpm    Problem(s) Oriented visit      ROS:  General and Resp. completed and negative except as noted above     HISTORY:   reports no history of alcohol use.   reports that he quit smoking about 19 years ago. His smoking use included cigarettes. He has never used smokeless tobacco.    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     Seizure disorder (H) 1/16/2014     Past Surgical History:   Procedure Laterality Date     COLONOSCOPY N/A 4/24/2018    Procedure: COMBINED COLONOSCOPY, SINGLE OR MULTIPLE BIOPSY/POLYPECTOMY BY BIOPSY;;  Surgeon: Zain Farley MD;  Location:  GI     COLONOSCOPY N/A 10/28/2021    Procedure: COLONOSCOPY, FLEXIBLE, WITH LESION REMOVAL USING SNARE;  Surgeon: Zain Farley MD;  Location:  GI     COLONOSCOPY N/A 10/28/2021    Procedure: Colonoscopy, With Polypectomy And Biopsy;  Surgeon: Zain Farley MD;  Location: [facility]     EXTERNAL EAR SURGERY         EXAM:  BP: 116/68   Pulse: 90    Temp: Data Unavailable    Wt Readings from Last 2 Encounters:   12/09/21 63.5 kg (140 lb)   09/30/21 62.6 kg (138 lb)       BMI= Body mass index is 22.94 kg/m .    EXAM:  APPEARANCE: = Relaxed and in no distress      Assessment/Plan:  Nhan was seen today for depression and  "consult.    Diagnoses and all orders for this visit:    Recurrent major depressive disorder, in full remission (H)    Elevated prostate specific antigen (PSA)  -     saw palmetto (SERENOA REPENS) 160 MG capsule; Take 3 capsules (480 mg) by mouth daily    Benign prostatic hyperplasia with urinary hesitancy  -     saw palmetto (SERENOA REPENS) 160 MG capsule; Take 3 capsules (480 mg) by mouth daily        COUNSELING:   reports that he quit smoking about 19 years ago. His smoking use included cigarettes. He has never used smokeless tobacco.    Estimated body mass index is 22.94 kg/m  as calculated from the following:    Height as of 9/30/21: 1.664 m (5' 5.5\").    Weight as of this encounter: 63.5 kg (140 lb).       Appropriate preventive services were discussed with this patient, including applicable screening as appropriate for cardiovascular disease, diabetes, osteopenia/osteoporosis, and glaucoma.  As appropriate for age/gender, discussed screening for colorectal cancer, prostate cancer, breast cancer, and cervical cancer. Checklist reviewing preventive services available has been given to the patient.    Reviewed patients plan of care and provided an AVS. The Basic Care Plan (routine screening as documented in Health Maintenance) for Nhan meets the Care Plan requirement. This Care Plan has been established and reviewed with the  Patient.      The following health maintenance items are reviewed in Epic and correct as of today:  Health Maintenance   Topic Date Due     PREVENTIVE CARE VISIT  Never done     ADVANCE CARE PLANNING  Never done     ZOSTER IMMUNIZATION (1 of 2) Never done     COVID-19 Vaccine (3 - Booster for Moderna series) 11/22/2021     PHQ-9  06/09/2022     COLORECTAL CANCER SCREENING  10/28/2024     DTAP/TDAP/TD IMMUNIZATION (2 - Td or Tdap) 05/26/2026     LIPID  09/21/2026     DEPRESSION ACTION PLAN  Completed     INFLUENZA VACCINE  Completed     Pneumococcal Vaccine: Pediatrics (0 to 5 Years) " and At-Risk Patients (6 to 64 Years)  Aged Out     IPV IMMUNIZATION  Aged Out     MENINGITIS IMMUNIZATION  Aged Out     HEPATITIS B IMMUNIZATION  Aged Out     HEPATITIS C SCREENING  Discontinued     HIV SCREENING  Discontinued       Claudy Juarez  [facility]  For any issues my office # is [phone]

## 2022-01-08 ENCOUNTER — HEALTH MAINTENANCE LETTER (OUTPATIENT)
Age: 52
End: 2022-01-08

## 2022-09-09 DIAGNOSIS — F32.1 MODERATE SINGLE CURRENT EPISODE OF MAJOR DEPRESSIVE DISORDER (H): ICD-10-CM

## 2022-09-09 DIAGNOSIS — I10 BENIGN ESSENTIAL HYPERTENSION: ICD-10-CM

## 2022-09-09 RX ORDER — SERTRALINE HYDROCHLORIDE 100 MG/1
TABLET, FILM COATED ORAL
Qty: 90 TABLET | Refills: 3 | Status: SHIPPED | OUTPATIENT
Start: 2022-09-09 | End: 2023-09-04

## 2022-09-09 RX ORDER — LISINOPRIL 20 MG/1
TABLET ORAL
Qty: 90 TABLET | Refills: 3 | Status: SHIPPED | OUTPATIENT
Start: 2022-09-09 | End: 2023-09-04

## 2022-09-24 ASSESSMENT — ANXIETY QUESTIONNAIRES
GAD7 TOTAL SCORE: 0
IF YOU CHECKED OFF ANY PROBLEMS ON THIS QUESTIONNAIRE, HOW DIFFICULT HAVE THESE PROBLEMS MADE IT FOR YOU TO DO YOUR WORK, TAKE CARE OF THINGS AT HOME, OR GET ALONG WITH OTHER PEOPLE: NOT DIFFICULT AT ALL
7. FEELING AFRAID AS IF SOMETHING AWFUL MIGHT HAPPEN: NOT AT ALL
5. BEING SO RESTLESS THAT IT IS HARD TO SIT STILL: NOT AT ALL
2. NOT BEING ABLE TO STOP OR CONTROL WORRYING: NOT AT ALL
4. TROUBLE RELAXING: NOT AT ALL
6. BECOMING EASILY ANNOYED OR IRRITABLE: NOT AT ALL
GAD7 TOTAL SCORE: 0
3. WORRYING TOO MUCH ABOUT DIFFERENT THINGS: NOT AT ALL
1. FEELING NERVOUS, ANXIOUS, OR ON EDGE: NOT AT ALL
8. IF YOU CHECKED OFF ANY PROBLEMS, HOW DIFFICULT HAVE THESE MADE IT FOR YOU TO DO YOUR WORK, TAKE CARE OF THINGS AT HOME, OR GET ALONG WITH OTHER PEOPLE?: NOT DIFFICULT AT ALL
GAD7 TOTAL SCORE: 0
7. FEELING AFRAID AS IF SOMETHING AWFUL MIGHT HAPPEN: NOT AT ALL

## 2022-09-24 ASSESSMENT — PATIENT HEALTH QUESTIONNAIRE - PHQ9
SUM OF ALL RESPONSES TO PHQ QUESTIONS 1-9: 0
SUM OF ALL RESPONSES TO PHQ QUESTIONS 1-9: 0
10. IF YOU CHECKED OFF ANY PROBLEMS, HOW DIFFICULT HAVE THESE PROBLEMS MADE IT FOR YOU TO DO YOUR WORK, TAKE CARE OF THINGS AT HOME, OR GET ALONG WITH OTHER PEOPLE: NOT DIFFICULT AT ALL

## 2022-09-26 ENCOUNTER — OFFICE VISIT (OUTPATIENT)
Dept: FAMILY MEDICINE | Facility: CLINIC | Age: 52
End: 2022-09-26

## 2022-09-26 VITALS
BODY MASS INDEX: 24.29 KG/M2 | SYSTOLIC BLOOD PRESSURE: 121 MMHG | HEIGHT: 65 IN | WEIGHT: 145.8 LBS | OXYGEN SATURATION: 99 % | DIASTOLIC BLOOD PRESSURE: 78 MMHG | HEART RATE: 78 BPM | RESPIRATION RATE: 16 BRPM

## 2022-09-26 DIAGNOSIS — R97.20 ELEVATED PROSTATE SPECIFIC ANTIGEN (PSA): ICD-10-CM

## 2022-09-26 DIAGNOSIS — F33.42 RECURRENT MAJOR DEPRESSIVE DISORDER, IN FULL REMISSION (H): ICD-10-CM

## 2022-09-26 DIAGNOSIS — R79.89 ABNORMAL CBC MEASUREMENT: ICD-10-CM

## 2022-09-26 DIAGNOSIS — Z23 NEED FOR VACCINATION: ICD-10-CM

## 2022-09-26 DIAGNOSIS — I10 BENIGN ESSENTIAL HYPERTENSION: Primary | ICD-10-CM

## 2022-09-26 DIAGNOSIS — Z13.6 SCREENING FOR HEART DISEASE: ICD-10-CM

## 2022-09-26 DIAGNOSIS — E78.00 ELEVATED LDL CHOLESTEROL LEVEL: ICD-10-CM

## 2022-09-26 LAB
CHOL/HDL RATIO (RMG): 5.4 MG/DL (ref 0–4.5)
CHOLESTEROL: 224 MG/DL (ref 100–199)
HDL (RMG): 41 MG/DL (ref 40–?)
LDL CALCULATED (RMG): 167 MG/DL (ref 0–130)
TRIGLYCERIDES (RMG): 79 MG/DL (ref 0–149)

## 2022-09-26 PROCEDURE — 99214 OFFICE O/P EST MOD 30 MIN: CPT | Mod: 25 | Performed by: FAMILY MEDICINE

## 2022-09-26 PROCEDURE — 93050 ART PRESSURE WAVEFORM ANALYS: CPT | Performed by: FAMILY MEDICINE

## 2022-09-26 PROCEDURE — 80061 LIPID PANEL: CPT | Mod: QW | Performed by: FAMILY MEDICINE

## 2022-09-26 PROCEDURE — 90471 IMMUNIZATION ADMIN: CPT | Mod: 59 | Performed by: FAMILY MEDICINE

## 2022-09-26 PROCEDURE — 90674 CCIIV4 VAC NO PRSV 0.5 ML IM: CPT | Performed by: FAMILY MEDICINE

## 2022-09-26 PROCEDURE — 36415 COLL VENOUS BLD VENIPUNCTURE: CPT | Performed by: FAMILY MEDICINE

## 2022-09-26 NOTE — PROGRESS NOTES
Answers for HPI/ROS submitted by the patient on 9/24/2022  If you checked off any problems, how difficult have these problems made it for you to do your work, take care of things at home, or get along with other people?: Not difficult at all  PHQ9 TOTAL SCORE: 0  ADINA 7 TOTAL SCORE: 0  Do you check your blood pressure regularly outside of the clinic?: No  Are your blood pressures ever more than 140 on the top number (systolic) OR more than 90 on the bottom number (diastolic)? (For example, greater than 140/90): No  Are you following a low salt diet?: No  Depression/Anxiety: Depression & Anxiety  Status since last visit:: good  Anxiety since last: : good  Other associated symptoms of depression:: No  Other associated symotome: : No  Significant life event: : No  Anxious:: No  Current substance use:: No    Depression:  Has known history of depression.  Has been on medication for this.  The patient does not report any significant side effects of this medication.  The prior symptoms leading to the original diagnosis and decision to start medication therapy are better.     Appetite is stable.  Sleeping patterns are stable.  No reported thoughts of suicide or homicide.    Last 3 PHQ9 results:  PHQ-9 SCORE 9/21/2021 9/30/2021 12/9/2021 9/24/2022   PHQ-9 Total Score MyChart - - - 0   PHQ-9 Total Score 13 10 2 0     Essential Hypertension   Remains well controlled when checked out of clinic.   he has not experienced any significant side effects from medications for hypertension.    NO active cardiac complaints or symptoms with exercise.  Current medications for treatment:  ACE inhibitors (Lisinopril, Ramipril,Captopril,Benazepril)    Reviewed last 6 BP readings in chart:  BP Readings from Last 6 Encounters:   09/26/22 121/78   12/09/21 116/68   10/28/21 121/86   09/30/21 122/74   09/21/21 128/84   11/06/20 122/84     Problem(s) Oriented visit      ROS:  General and Resp. completed and negative except as noted above      HISTORY:   reports no history of alcohol use.   reports that he quit smoking about 20 years ago. His smoking use included cigarettes. He has never used smokeless tobacco.    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     Seizure disorder (H) 1/16/2014     Past Surgical History:   Procedure Laterality Date     COLONOSCOPY N/A 4/24/2018    Procedure: COMBINED COLONOSCOPY, SINGLE OR MULTIPLE BIOPSY/POLYPECTOMY BY BIOPSY;;  Surgeon: Zain Farley MD;  Location:  GI     COLONOSCOPY N/A 10/28/2021    Procedure: COLONOSCOPY, FLEXIBLE, WITH LESION REMOVAL USING SNARE;  Surgeon: Zain Farley MD;  Location:  GI     COLONOSCOPY N/A 10/28/2021    Procedure: Colonoscopy, With Polypectomy And Biopsy;  Surgeon: Zain Farley MD;  Location: [facility]     EXTERNAL EAR SURGERY         EXAM:  BP: 121/78[AtCor[   Pulse: 78    Temp: Data Unavailable    Wt Readings from Last 2 Encounters:   09/26/22 66.1 kg (145 lb 12.8 oz)   12/09/21 63.5 kg (140 lb)       BMI= Body mass index is 24.26 kg/m .    EXAM:  APPEARANCE: = Relaxed and in no distress  Resp effort = Calm regular breathing  Breath Sounds = Good air movement with no rales or rhonchi in any lung fields  Heart Rate, Rythym, & sounds (no Murm)  = Regular rate and rythym with no S3, S4, or murmer appreciated.  Ext (edema) = No pretibial edema noted or elsewhere  Recent/Remote Memory = Alert and Oriented x 3      Assessment/Plan:  Nhan was seen today for recheck.    Diagnoses and all orders for this visit:    Benign essential hypertension  -     ID ART PRESS WAVEFORM ANALYS CENTRAL ART PRESSURE  -     CBC with Diff/Plt ([facility])  -     Comp. Metabolic Panel (14) ([facility])    Recurrent major depressive disorder, in full remission (H)  Spoke at length about mood disorders including suspected pathophysiology,  "role of neurotransmitters, and treatment options including medication options ( especially SSRIs that treat cause of sx) and counselling.  Tolerating current meds. We discussed ongoing management of his  medications and how we will refill the medications now and in the future.    Elevated prostate specific antigen (PSA)  Reviewed last years 4K score.-     PSA Serum ([facility])    Need for vaccination  -     INFLUENZA,INJ,MDCK,PF,QUAD >6MO([facility])    Screening for heart disease  -     Lipid Panel ([facility])        COUNSELING:   reports that he quit smoking about 20 years ago. His smoking use included cigarettes. He has never used smokeless tobacco.    Estimated body mass index is 24.26 kg/m  as calculated from the following:    Height as of this encounter: 1.651 m (5' 5\").    Weight as of this encounter: 66.1 kg (145 lb 12.8 oz).       Appropriate preventive services were discussed with this patient, including applicable screening as appropriate for cardiovascular disease, diabetes, osteopenia/osteoporosis, and glaucoma.  As appropriate for age/gender, discussed screening for colorectal cancer, prostate cancer, breast cancer, and cervical cancer. Checklist reviewing preventive services available has been given to the patient.    Reviewed patients plan of care and provided an AVS. The Basic Care Plan (routine screening as documented in Health Maintenance) for Nhan meets the Care Plan requirement. This Care Plan has been established and reviewed with the  Patient.      The following health maintenance items are reviewed in Epic and correct as of today:  Health Maintenance   Topic Date Due     PREVENTIVE CARE VISIT  Never done     ADVANCE CARE PLANNING  Never done     COVID-19 Vaccine (5 - Booster for Moderna series) 08/13/2022     INFLUENZA VACCINE (1) 09/01/2022     ZOSTER IMMUNIZATION (2 of 2) 08/13/2022     PHQ-9  03/26/2023     COLORECTAL CANCER SCREENING  10/28/2024     DTAP/TDAP/TD IMMUNIZATION (2 - Td " or Tdap) 05/26/2026     LIPID  09/21/2026     DEPRESSION ACTION PLAN  Completed     Pneumococcal Vaccine: Pediatrics (0 to 5 Years) and At-Risk Patients (6 to 64 Years)  Aged Out     IPV IMMUNIZATION  Aged Out     MENINGITIS IMMUNIZATION  Aged Out     HEPATITIS B IMMUNIZATION  Aged Out     HEPATITIS C SCREENING  Discontinued     HIV SCREENING  Discontinued       Claudy Juarez  [facility]  For any issues my office # is [phone]

## 2022-09-27 LAB
ALBUMIN SERPL-MCNC: 4.3 G/DL (ref 3.8–4.9)
ALBUMIN/GLOB SERPL: 1.6 {RATIO} (ref 1.2–2.2)
ALP SERPL-CCNC: 92 IU/L (ref 44–121)
ALT SERPL-CCNC: 8 IU/L (ref 0–44)
AST SERPL-CCNC: 10 IU/L (ref 0–40)
BASOPHILS # BLD AUTO: 0.1 X10E3/UL (ref 0–0.2)
BASOPHILS NFR BLD AUTO: 1 %
BILIRUB SERPL-MCNC: 0.6 MG/DL (ref 0–1.2)
BUN SERPL-MCNC: 13 MG/DL (ref 6–24)
BUN/CREATININE RATIO: 15 (ref 9–20)
CALCIUM SERPL-MCNC: 9.1 MG/DL (ref 8.7–10.2)
CHLORIDE SERPLBLD-SCNC: 97 MMOL/L (ref 96–106)
CREAT SERPL-MCNC: 0.87 MG/DL (ref 0.76–1.27)
EGFR: 103 ML/MIN/1.73
EOSINOPHIL # BLD AUTO: 0.2 X10E3/UL (ref 0–0.4)
EOSINOPHIL NFR BLD AUTO: 2 %
ERYTHROCYTE [DISTWIDTH] IN BLOOD BY AUTOMATED COUNT: 12.9 % (ref 11.6–15.4)
GLOBULIN, TOTAL: 2.7 G/DL (ref 1.5–4.5)
GLUCOSE SERPL-MCNC: 98 MG/DL (ref 70–99)
HCT VFR BLD AUTO: 47.7 % (ref 37.5–51)
HEMATOLOGY COMMENTS:: ABNORMAL
HEMOGLOBIN: 15.4 G/DL (ref 13–17.7)
LYMPHOCYTES # BLD AUTO: 1.1 X10E3/UL (ref 0.7–3.1)
LYMPHOCYTES NFR BLD AUTO: 11 %
MCH RBC QN AUTO: 28.1 PG (ref 26.6–33)
MCHC RBC AUTO-ENTMCNC: 32.3 G/DL (ref 31.5–35.7)
MCV RBC AUTO: 87 FL (ref 79–97)
MONOCYTES # BLD AUTO: 0.9 X10E3/UL (ref 0.1–0.9)
MONOCYTES NFR BLD AUTO: 9 %
NEUTROPHILS # BLD AUTO: 7.5 X10E3/UL (ref 1.4–7)
NEUTROPHILS NFR BLD AUTO: 77 %
PLATELET COMMENTS: ABNORMAL
PLATELETS: 286 X10E3/UL (ref 150–450)
POTASSIUM SERPL-SCNC: 4 MMOL/L (ref 3.5–5.2)
PROT SERPL-MCNC: 7 G/DL (ref 6–8.5)
PSA NG/ML: 9.3 NG/ML (ref 0–4)
RBC # BLD AUTO: 5.49 X10E6/UL (ref 4.14–5.8)
RBC COMMENTS: ABNORMAL
SODIUM SERPL-SCNC: 137 MMOL/L (ref 134–144)
TOTAL CO2: 24 MMOL/L (ref 20–29)
WBC # BLD AUTO: 9.7 X10E3/UL (ref 3.4–10.8)

## 2022-11-17 ENCOUNTER — TRANSFERRED RECORDS (OUTPATIENT)
Dept: FAMILY MEDICINE | Facility: CLINIC | Age: 52
End: 2022-11-17

## 2023-04-15 ENCOUNTER — HEALTH MAINTENANCE LETTER (OUTPATIENT)
Age: 53
End: 2023-04-15

## 2023-09-01 DIAGNOSIS — I10 BENIGN ESSENTIAL HYPERTENSION: ICD-10-CM

## 2023-09-01 DIAGNOSIS — F32.1 MODERATE SINGLE CURRENT EPISODE OF MAJOR DEPRESSIVE DISORDER (H): ICD-10-CM

## 2023-09-01 NOTE — TELEPHONE ENCOUNTER
Med: Lisinopril   Sertraline     LOV (related): 9/26/22    Last Lab: 9/26/22      Due for F/U around: None      Next Appt: None           BP Readings from Last 3 Encounters:   09/26/22 121/78   12/09/21 116/68   10/28/21 121/86       Last Comprehensive Metabolic Panel:  Lab Results   Component Value Date     09/26/2022    POTASSIUM 4.0 09/26/2022    CHLORIDE 97 09/26/2022    CO2 30 11/06/2020    ANIONGAP 5 11/06/2020    GLC 98 09/26/2022    BUN 13 09/26/2022    BUN 15 09/26/2022    CR 0.87 09/26/2022    GFRESTIMATED >90 11/06/2020    BRANDON 9.1 09/26/2022 9/30/2021     2:37 PM 12/9/2021     2:01 PM 9/24/2022     8:44 AM   PHQ   PHQ-9 Total Score 10 2 0   Q9: Thoughts of better off dead/self-harm past 2 weeks Not at all Not at all Not at all           9/21/2021    10:41 AM 9/30/2021     2:37 PM 9/24/2022     8:48 AM   ADINA-7 SCORE   Total Score   0 (minimal anxiety)   Total Score 8 6 0

## 2023-09-04 RX ORDER — LISINOPRIL 20 MG/1
TABLET ORAL
Qty: 90 TABLET | Refills: 3 | Status: SHIPPED | OUTPATIENT
Start: 2023-09-04 | End: 2024-07-22

## 2023-09-04 RX ORDER — SERTRALINE HYDROCHLORIDE 100 MG/1
TABLET, FILM COATED ORAL
Qty: 90 TABLET | Refills: 3 | Status: SHIPPED | OUTPATIENT
Start: 2023-09-04 | End: 2024-08-21

## 2023-09-14 ASSESSMENT — ANXIETY QUESTIONNAIRES
7. FEELING AFRAID AS IF SOMETHING AWFUL MIGHT HAPPEN: NOT AT ALL
GAD7 TOTAL SCORE: 1
4. TROUBLE RELAXING: NOT AT ALL
2. NOT BEING ABLE TO STOP OR CONTROL WORRYING: NOT AT ALL
IF YOU CHECKED OFF ANY PROBLEMS ON THIS QUESTIONNAIRE, HOW DIFFICULT HAVE THESE PROBLEMS MADE IT FOR YOU TO DO YOUR WORK, TAKE CARE OF THINGS AT HOME, OR GET ALONG WITH OTHER PEOPLE: NOT DIFFICULT AT ALL
3. WORRYING TOO MUCH ABOUT DIFFERENT THINGS: NOT AT ALL
GAD7 TOTAL SCORE: 1
6. BECOMING EASILY ANNOYED OR IRRITABLE: SEVERAL DAYS
1. FEELING NERVOUS, ANXIOUS, OR ON EDGE: NOT AT ALL
5. BEING SO RESTLESS THAT IT IS HARD TO SIT STILL: NOT AT ALL

## 2023-09-20 NOTE — PROGRESS NOTES
Answers submitted by the patient for this visit:  ADINA-7 (Submitted on 9/14/2023)  ADINA 7 TOTAL SCORE: 1  Hypertension Visit (Submitted on 9/14/2023)  Chief Complaint: Chronic problems general questions HPI Form  Do you check your blood pressure regularly outside of the clinic?: No  Are your blood pressures ever more than 140 on the top number (systolic) OR more than 90 on the bottom number (diastolic)? (For example, greater than 140/90): No  Are you following a low salt diet?: No  Depression / Anxiety Questionnaire (Submitted on 9/14/2023)  Chief Complaint: Chronic problems general questions HPI Form  Depression/Anxiety: Anxiety  Anxiety only (Submitted on 9/14/2023)  Chief Complaint: Chronic problems general questions HPI Form  Anxiety since last: : good  Other associated symotome: : No  Significant life event: : No  Anxious:: No  Current substance use:: No  General Questionnaire (Submitted on 9/14/2023)  Chief Complaint: Chronic problems general questions HPI Form  How many servings of fruits and vegetables do you eat daily?: 0-1  On average, how many sweetened beverages do you drink each day (Examples: soda, juice, sweet tea, etc.  Do NOT count diet or artificially sweetened beverages)?: 3  How many minutes a day do you exercise enough to make your heart beat faster?: 10 to 19  How many days a week do you exercise enough to make your heart beat faster?: 3 or less  How many days per week do you miss taking your medication?: 1  What makes it hard for you to take your medication every day?: remembering to take    Problem(s) Oriented visit        SUBJECTIVE:                                                    Nhan Bryan is a 52 year old male who presents to clinic today for the following health issues :      1. Benign essential hypertension  Missed meds this morning    2. Recurrent major depressive disorder, in full remission (H)  Depression:  Has known history of depression.  Has been on medication for this.   The patient does not report any significant side effects of this medication.  The prior symptoms leading to the original diagnosis and decision to start medication therapy are better.     Appetite is stable.  Sleeping patterns are stable.  No reported thoughts of suicide or homicide.    Last 3 PHQ9 results:      9/30/2021     2:37 PM 12/9/2021     2:01 PM 9/24/2022     8:44 AM 9/21/2023    10:47 AM   PHQ-9 SCORE   PHQ-9 Total Score MyChart   0    PHQ-9 Total Score 10 2 0 2         3. Astrocytoma brain tumor (H) Low grade, with not much change in the past 19 years follows with Neurology      4. Neurofibromatosis, peripheral, NF1 (H)  stable    5. Seizure disorder (H) Last seizure in 2020 related to stress  On meds and last was covid era stress     6. Elevated prostate specific antigen (PSA)  Following with urology  - Prostate Specific Antigen Screen; Future    7. Screening for heart disease    - Lipid Profile ([facility])       Problem list, Medication list, Allergies, and Medical/Social/Surgical histories reviewed in [facility] and updated as appropriate.   Additional history: as documented    ROS:  General and Resp. completed and negative except as noted above    Histories:   Patient Active Problem List   Diagnosis    Seizure disorder (H) Last seizure in 2020 related to stress    Astrocytoma brain tumor (H) Low grade, with not much change in the past 19 years follows with Neurology    Neurofibromatosis, peripheral, NF1 (H)    Health Care Home    Benign essential hypertension    Major depression in complete remission (H)     Past Surgical History:   Procedure Laterality Date    COLONOSCOPY N/A 4/24/2018    Procedure: COMBINED COLONOSCOPY, SINGLE OR MULTIPLE BIOPSY/POLYPECTOMY BY BIOPSY;;  Surgeon: Zain Farley MD;  Location:  GI    COLONOSCOPY N/A 10/28/2021    Procedure: COLONOSCOPY, FLEXIBLE, WITH LESION REMOVAL USING SNARE;  Surgeon: Zain Farley MD;  Location:  GI    COLONOSCOPY N/A 10/28/2021    Procedure:  "Colonoscopy, With Polypectomy And Biopsy;  Surgeon: Zain Farley MD;  Location:  GI    EXTERNAL EAR SURGERY         Social History     Tobacco Use    Smoking status: Former     Types: Cigarettes     Quit date: 2002     Years since quittin.7    Smokeless tobacco: Never   Substance Use Topics    Alcohol use: No     Alcohol/week: 0.0 standard drinks of alcohol     Comment: quit      Family History   Problem Relation Age of Onset    Cancer - colorectal Father     Cancer - colorectal Mother            OBJECTIVE:                                                    BP (!) 130/95   Pulse 66   Ht 1.664 m (5' 5.5\")   Wt 66.3 kg (146 lb 3.2 oz)   SpO2 96%   BMI 23.96 kg/m    Body mass index is 23.96 kg/m .   APPEARANCE: = Relaxed and in no distress  Conj/Eyelids = noninjected and lids and lashes are without inflammation  PERRLA/Irises = Pupils Round Reactive to Light and Irisis without inflammation  Neck = No anterior or posterior adenopathy appreciated.  Thyroid = Not enlarged and no masses felt  Resp effort = Calm regular breathing  Breath Sounds = Good air movement with no rales or rhonchi in any lung fields  Heart Rate, Rhythm, & sounds (no Murm)  = Regular rate and rhythm with no S3, S4, or murmur appreciated.  Carotid Art's = Pulses full and equal and no bruits appreciated  Abdomen = Soft, nontender, no masses, & bowel sounds in all quadrants  Liver/Spleen = Normal span and no splenomegaly noted  Digits and Nails = FROM in all finger joints, no nail dystrophy  Ext (edema) = No pretibial edema noted or elsewhere  Musculsktl =  Strength and ROM of major joints are within normal limits  SKIN: no suspicious lesions or rashes and hundreds of fibromas  Recent/Remote Memory = Alert and Oriented x 3  Mood/Affect = Cooperative and interested     ASSESSMENT/PLAN:                                                        Nhan was seen today for recheck medication.    Diagnoses and all orders for this " visit:    Benign essential hypertension  Reviewed his current HTN management. Discussed our goal for him is a systolic pressure at or below 128 and diastolic pressure at or below 83.  We today managed his prescriptions with refills ensured to ensure availabilty of current medications.  Discussed the importance for aggressive management of HTN to prevent vascular complications later.  Recommended lower fat, lower carbohydrate, and lower sodium (<2000 mg)diet. Required intervals for follow up on HTN, lab studies reviewed.    Strongly recommened he follow his blood pressures outside the clinic to ensure that BPs are remaining within guidelines,.  Instructed to contact me if the readings are not within guidelines on a regular basis so we can adjust treatment as needed.    -     CBC with Diff/Plt ([facility])  -     Comprehensive metabolic panel; Future    Recurrent major depressive disorder, in full remission (H)  Spoke at length about mood disorders including suspected pathophysiology, role of neurotransmitters, and treatment options including medication options ( especially SSRIs that treat cause of sx) and counselling.  Tolerating current meds. We discussed ongoing management of his  medications and how we will refill the medications now and in the future.      Astrocytoma brain tumor (H) Low grade, with not much change in the past 19 years follows with Neurology  Actively managed by neurology    Neurofibromatosis, peripheral, NF1 (H)  Considering getting some irritating tumors removed.    Seizure disorder (H) Last seizure in 2020 related to stress  Tolerating current meds well    Elevated prostate specific antigen (PSA)  Following with Urology who actively manages next steps.  -     Prostate Specific Antigen Screen; Future    Screening for heart disease  -     Lipid Profile ([facility])        Regular exercise    There are no Patient Instructions on file for this visit.    The following health maintenance items are reviewed in  Epic and correct as of today:  Health Maintenance   Topic Date Due    ADVANCE CARE PLANNING  Never done    INFLUENZA VACCINE (1) 09/01/2023    PHQ-9  03/21/2024    YEARLY PREVENTIVE VISIT  09/21/2024    DTAP/TDAP/TD IMMUNIZATION (2 - Td or Tdap) 05/26/2026    COLORECTAL CANCER SCREENING  10/28/2026    LIPID  09/26/2027    DEPRESSION ACTION PLAN  Completed    ZOSTER IMMUNIZATION  Completed    COVID-19 Vaccine  Completed    Pneumococcal Vaccine: Pediatrics (0 to 5 Years) and At-Risk Patients (6 to 64 Years)  Aged Out    IPV IMMUNIZATION  Aged Out    HPV IMMUNIZATION  Aged Out    MENINGITIS IMMUNIZATION  Aged Out    HEPATITIS C SCREENING  Discontinued    HIV SCREENING  Discontinued    HEPATITIS B IMMUNIZATION  Discontinued    LUNG CANCER SCREENING  Discontinued       Claudy Juarez MD  [facility]  Family Practice  [facility]  [phone]    For any issues my office # is [phone]

## 2023-09-21 ENCOUNTER — OFFICE VISIT (OUTPATIENT)
Dept: FAMILY MEDICINE | Facility: CLINIC | Age: 53
End: 2023-09-21

## 2023-09-21 VITALS
HEIGHT: 66 IN | BODY MASS INDEX: 23.5 KG/M2 | WEIGHT: 146.2 LBS | OXYGEN SATURATION: 96 % | SYSTOLIC BLOOD PRESSURE: 130 MMHG | DIASTOLIC BLOOD PRESSURE: 95 MMHG | HEART RATE: 66 BPM

## 2023-09-21 DIAGNOSIS — R97.20 ELEVATED PROSTATE SPECIFIC ANTIGEN (PSA): ICD-10-CM

## 2023-09-21 DIAGNOSIS — I10 BENIGN ESSENTIAL HYPERTENSION: Primary | ICD-10-CM

## 2023-09-21 DIAGNOSIS — G40.909 SEIZURE DISORDER (H): ICD-10-CM

## 2023-09-21 DIAGNOSIS — Z13.6 SCREENING FOR HEART DISEASE: ICD-10-CM

## 2023-09-21 DIAGNOSIS — F33.42 RECURRENT MAJOR DEPRESSIVE DISORDER, IN FULL REMISSION (H): ICD-10-CM

## 2023-09-21 DIAGNOSIS — C71.9 ASTROCYTOMA BRAIN TUMOR (H): ICD-10-CM

## 2023-09-21 DIAGNOSIS — Q85.01 NEUROFIBROMATOSIS, PERIPHERAL, NF1 (H): ICD-10-CM

## 2023-09-21 LAB
% GRANULOCYTES: 84.5 % (ref 42.2–75.2)
ALBUMIN SERPL BCG-MCNC: 4.6 G/DL (ref 3.5–5.2)
ALP SERPL-CCNC: 71 U/L (ref 40–129)
ALT SERPL W P-5'-P-CCNC: 12 U/L (ref 0–70)
ANION GAP SERPL CALCULATED.3IONS-SCNC: 11 MMOL/L (ref 7–15)
AST SERPL W P-5'-P-CCNC: 11 U/L (ref 0–45)
BILIRUB SERPL-MCNC: 0.7 MG/DL
BUN SERPL-MCNC: 10.9 MG/DL (ref 6–20)
CALCIUM SERPL-MCNC: 9.3 MG/DL (ref 8.6–10)
CHLORIDE SERPL-SCNC: 95 MMOL/L (ref 98–107)
CHOLESTEROL: 236 MG/DL (ref 100–199)
CREAT SERPL-MCNC: 0.81 MG/DL (ref 0.67–1.17)
DEPRECATED HCO3 PLAS-SCNC: 27 MMOL/L (ref 22–29)
EGFRCR SERPLBLD CKD-EPI 2021: >90 ML/MIN/1.73M2
FASTING?: YES
GLUCOSE SERPL-MCNC: 103 MG/DL (ref 70–99)
HCT VFR BLD AUTO: 44.5 % (ref 39–51)
HDL (RMG): 32 MG/DL (ref 40–?)
HEMOGLOBIN: 14.8 G/DL (ref 13.4–17.5)
LDL CALCULATED (RMG): 191 MG/DL (ref 0–130)
LYMPHOCYTES NFR BLD AUTO: 12.3 % (ref 20.5–51.1)
MCH RBC QN AUTO: 28.1 PG (ref 27–31)
MCHC RBC AUTO-ENTMCNC: 33.2 G/DL (ref 33–37)
MCV RBC AUTO: 84.6 FL (ref 80–100)
MONOCYTES NFR BLD AUTO: 3.2 % (ref 1.7–9.3)
PLATELET # BLD AUTO: 307 K/UL (ref 140–450)
POTASSIUM SERPL-SCNC: 3.8 MMOL/L (ref 3.4–5.3)
PROT SERPL-MCNC: 7.2 G/DL (ref 6.4–8.3)
PSA SERPL DL<=0.01 NG/ML-MCNC: 9.44 NG/ML (ref 0–3.5)
RBC # BLD AUTO: 5.25 X10/CMM (ref 4.2–5.9)
SODIUM SERPL-SCNC: 133 MMOL/L (ref 136–145)
TRIGLYCERIDES (RMG): 68 MG/DL (ref 0–149)
WBC # BLD AUTO: 6.3 X10/CMM (ref 3.8–11)

## 2023-09-21 PROCEDURE — G0103 PSA SCREENING: HCPCS | Performed by: FAMILY MEDICINE

## 2023-09-21 PROCEDURE — 80053 COMPREHEN METABOLIC PANEL: CPT | Performed by: FAMILY MEDICINE

## 2023-09-21 PROCEDURE — 85025 COMPLETE CBC W/AUTO DIFF WBC: CPT | Performed by: FAMILY MEDICINE

## 2023-09-21 PROCEDURE — 36415 COLL VENOUS BLD VENIPUNCTURE: CPT | Performed by: FAMILY MEDICINE

## 2023-09-21 PROCEDURE — 80061 LIPID PANEL: CPT | Mod: QW | Performed by: FAMILY MEDICINE

## 2023-09-21 PROCEDURE — 99396 PREV VISIT EST AGE 40-64: CPT | Performed by: FAMILY MEDICINE

## 2023-09-21 ASSESSMENT — PATIENT HEALTH QUESTIONNAIRE - PHQ9: SUM OF ALL RESPONSES TO PHQ QUESTIONS 1-9: 2

## 2023-09-23 NOTE — RESULT ENCOUNTER NOTE
Dear Nhan,     Your PSA is pretty stable from last year. I would discuss with your Urologist.  Your lipids are a little worse, so a bit better diet would be a good thing.    Claudy Juarez MD

## 2023-11-02 ENCOUNTER — PATIENT OUTREACH (OUTPATIENT)
Dept: GASTROENTEROLOGY | Facility: CLINIC | Age: 53
End: 2023-11-02
Payer: COMMERCIAL

## 2023-11-09 ENCOUNTER — TRANSFERRED RECORDS (OUTPATIENT)
Dept: FAMILY MEDICINE | Facility: CLINIC | Age: 53
End: 2023-11-09

## 2024-01-22 ENCOUNTER — PATIENT OUTREACH (OUTPATIENT)
Dept: GASTROENTEROLOGY | Facility: CLINIC | Age: 54
End: 2024-01-22

## 2024-01-22 NOTE — PROGRESS NOTES
Patient part of [facility] and does not meet inclusion criteria for management by CRC team.

## 2024-03-14 ENCOUNTER — TRANSFERRED RECORDS (OUTPATIENT)
Dept: FAMILY MEDICINE | Facility: CLINIC | Age: 54
End: 2024-03-14

## 2024-03-22 NOTE — PROGRESS NOTES
SUBJECTIVE:  Nhan Bryan is a 46 year old male     OBJECTIVE:  /80 mmHg  Pulse 107  Temp(Src) 97.8  F (36.6  C)  Wt 142 lb 14.4 oz (64.819 kg)  SpO2 98%  Exam; ent; within normal limits  Eyes; extraocular movements intact pupils equal round reactive to light. Anterior chambers were clear. Conjunctiva on the left was red slight swelling of the anterior blephera. Examination of the upper and lower lid with inversion of the upper lid showed no obvious foreign bodies. We did sweep the lids as a precaution.  In addition of flourazine stain revealed some generalized uptake of the floor is seen on the lateral aspect of the cornea.    ASSESSMENT:\  1. Eye pain; differential diagnoses appears to be corneal abrasion, scleritis episcleritis. His pain is minimal and he describes it more as a discomfort. In addition it began when he was rubbing his eye. He denies any visual problems. His exam was basically negative other than some mild uptake in the cornea    No foreign body was found    .PLAN:  1. Irrigated his eye to take out the Flurozine.   2. Antibiotic drops  3. Follow-up PCP or ophthalmology 48 hours, sooner if increasing problems   Yes

## 2024-07-20 DIAGNOSIS — I10 BENIGN ESSENTIAL HYPERTENSION: ICD-10-CM

## 2024-07-22 RX ORDER — LISINOPRIL 20 MG/1
TABLET ORAL
Qty: 90 TABLET | Refills: 3 | Status: SHIPPED | OUTPATIENT
Start: 2024-07-22

## 2024-08-21 DIAGNOSIS — F32.1 MODERATE SINGLE CURRENT EPISODE OF MAJOR DEPRESSIVE DISORDER (H): ICD-10-CM

## 2024-08-21 RX ORDER — SERTRALINE HYDROCHLORIDE 100 MG/1
TABLET, FILM COATED ORAL
Qty: 90 TABLET | Refills: 3 | Status: SHIPPED | OUTPATIENT
Start: 2024-08-21 | End: 2024-10-07

## 2024-10-07 ENCOUNTER — OFFICE VISIT (OUTPATIENT)
Dept: FAMILY MEDICINE | Facility: CLINIC | Age: 54
End: 2024-10-07

## 2024-10-07 VITALS
HEART RATE: 80 BPM | HEIGHT: 66 IN | OXYGEN SATURATION: 99 % | SYSTOLIC BLOOD PRESSURE: 123 MMHG | DIASTOLIC BLOOD PRESSURE: 94 MMHG | WEIGHT: 148.2 LBS | BODY MASS INDEX: 23.82 KG/M2

## 2024-10-07 DIAGNOSIS — R97.20 ELEVATED PROSTATE SPECIFIC ANTIGEN (PSA): ICD-10-CM

## 2024-10-07 DIAGNOSIS — Z12.5 SCREENING FOR PROSTATE CANCER: ICD-10-CM

## 2024-10-07 DIAGNOSIS — Q85.01 NEUROFIBROMATOSIS, PERIPHERAL, NF1 (H): ICD-10-CM

## 2024-10-07 DIAGNOSIS — F32.1 MODERATE SINGLE CURRENT EPISODE OF MAJOR DEPRESSIVE DISORDER (H): ICD-10-CM

## 2024-10-07 DIAGNOSIS — Z13.6 SCREENING FOR HEART DISEASE: ICD-10-CM

## 2024-10-07 DIAGNOSIS — I10 BENIGN ESSENTIAL HYPERTENSION: ICD-10-CM

## 2024-10-07 DIAGNOSIS — F43.9 STRESS: Primary | ICD-10-CM

## 2024-10-07 LAB
ALBUMIN SERPL BCG-MCNC: 4.4 G/DL (ref 3.5–5.2)
ALP SERPL-CCNC: 78 U/L (ref 40–150)
ALT SERPL W P-5'-P-CCNC: 12 U/L (ref 0–70)
ANION GAP SERPL CALCULATED.3IONS-SCNC: 10 MMOL/L (ref 7–15)
AST SERPL W P-5'-P-CCNC: 12 U/L (ref 0–45)
BASOPHILS # BLD AUTO: 0.1 10E3/UL (ref 0–0.2)
BASOPHILS NFR BLD AUTO: 1 %
BILIRUB SERPL-MCNC: 0.4 MG/DL
BUN SERPL-MCNC: 10 MG/DL (ref 6–20)
CALCIUM SERPL-MCNC: 9.3 MG/DL (ref 8.8–10.4)
CHLORIDE SERPL-SCNC: 97 MMOL/L (ref 98–107)
CHOLESTEROL: 225 MG/DL (ref 100–199)
CREAT SERPL-MCNC: 0.87 MG/DL (ref 0.67–1.17)
EGFRCR SERPLBLD CKD-EPI 2021: >90 ML/MIN/1.73M2
EOSINOPHIL # BLD AUTO: 0.1 10E3/UL (ref 0–0.7)
EOSINOPHIL NFR BLD AUTO: 1 %
ERYTHROCYTE [DISTWIDTH] IN BLOOD BY AUTOMATED COUNT: 12.4 % (ref 10–15)
FASTING STATUS PATIENT QL REPORTED: ABNORMAL
FASTING?: NO
GLUCOSE SERPL-MCNC: 85 MG/DL (ref 70–99)
HCO3 SERPL-SCNC: 29 MMOL/L (ref 22–29)
HCT VFR BLD AUTO: 45.1 % (ref 40–53)
HDL (RMG): 41 MG/DL (ref 40–?)
HGB BLD-MCNC: 15.1 G/DL (ref 13.3–17.7)
IMM GRANULOCYTES # BLD: 0 10E3/UL
IMM GRANULOCYTES NFR BLD: 1 %
LDL CALCULATED (RMG): 159 MG/DL (ref 0–130)
LYMPHOCYTES # BLD AUTO: 0.9 10E3/UL (ref 0.8–5.3)
LYMPHOCYTES NFR BLD AUTO: 11 %
MCH RBC QN AUTO: 28.7 PG (ref 26.5–33)
MCHC RBC AUTO-ENTMCNC: 33.5 G/DL (ref 31.5–36.5)
MCV RBC AUTO: 86 FL (ref 78–100)
MONOCYTES # BLD AUTO: 0.7 10E3/UL (ref 0–1.3)
MONOCYTES NFR BLD AUTO: 8 %
NEUTROPHILS # BLD AUTO: 6.5 10E3/UL (ref 1.6–8.3)
NEUTROPHILS NFR BLD AUTO: 79 %
NRBC # BLD AUTO: 0 10E3/UL
NRBC BLD AUTO-RTO: 0 /100
PLATELET # BLD AUTO: 328 10E3/UL (ref 150–450)
POTASSIUM SERPL-SCNC: 3.9 MMOL/L (ref 3.4–5.3)
PROT SERPL-MCNC: 7.3 G/DL (ref 6.4–8.3)
PSA SERPL DL<=0.01 NG/ML-MCNC: 14.9 NG/ML (ref 0–3.5)
RBC # BLD AUTO: 5.27 10E6/UL (ref 4.4–5.9)
SODIUM SERPL-SCNC: 136 MMOL/L (ref 135–145)
TRIGLYCERIDES (RMG): 129 MG/DL (ref 0–149)
WBC # BLD AUTO: 8.2 10E3/UL (ref 4–11)

## 2024-10-07 PROCEDURE — 36415 COLL VENOUS BLD VENIPUNCTURE: CPT | Performed by: FAMILY MEDICINE

## 2024-10-07 PROCEDURE — 99214 OFFICE O/P EST MOD 30 MIN: CPT | Performed by: FAMILY MEDICINE

## 2024-10-07 PROCEDURE — G2211 COMPLEX E/M VISIT ADD ON: HCPCS | Performed by: FAMILY MEDICINE

## 2024-10-07 PROCEDURE — 85025 COMPLETE CBC W/AUTO DIFF WBC: CPT | Mod: ORL | Performed by: FAMILY MEDICINE

## 2024-10-07 PROCEDURE — 80053 COMPREHEN METABOLIC PANEL: CPT | Performed by: FAMILY MEDICINE

## 2024-10-07 PROCEDURE — 84153 ASSAY OF PSA TOTAL: CPT | Mod: ORL | Performed by: FAMILY MEDICINE

## 2024-10-07 PROCEDURE — 80061 LIPID PANEL: CPT | Mod: QW | Performed by: FAMILY MEDICINE

## 2024-10-07 RX ORDER — SERTRALINE HYDROCHLORIDE 100 MG/1
200 TABLET, FILM COATED ORAL DAILY
Qty: 180 TABLET | Refills: 3 | Status: SHIPPED | OUTPATIENT
Start: 2024-10-07 | End: 2024-10-07

## 2024-10-07 RX ORDER — SERTRALINE HYDROCHLORIDE 100 MG/1
200 TABLET, FILM COATED ORAL DAILY
Qty: 180 TABLET | Refills: 3 | Status: SHIPPED | OUTPATIENT
Start: 2024-10-07

## 2024-10-07 ASSESSMENT — ANXIETY QUESTIONNAIRES
6. BECOMING EASILY ANNOYED OR IRRITABLE: MORE THAN HALF THE DAYS
GAD7 TOTAL SCORE: 12
IF YOU CHECKED OFF ANY PROBLEMS ON THIS QUESTIONNAIRE, HOW DIFFICULT HAVE THESE PROBLEMS MADE IT FOR YOU TO DO YOUR WORK, TAKE CARE OF THINGS AT HOME, OR GET ALONG WITH OTHER PEOPLE: SOMEWHAT DIFFICULT
2. NOT BEING ABLE TO STOP OR CONTROL WORRYING: MORE THAN HALF THE DAYS
8. IF YOU CHECKED OFF ANY PROBLEMS, HOW DIFFICULT HAVE THESE MADE IT FOR YOU TO DO YOUR WORK, TAKE CARE OF THINGS AT HOME, OR GET ALONG WITH OTHER PEOPLE?: SOMEWHAT DIFFICULT
4. TROUBLE RELAXING: MORE THAN HALF THE DAYS
1. FEELING NERVOUS, ANXIOUS, OR ON EDGE: MORE THAN HALF THE DAYS
3. WORRYING TOO MUCH ABOUT DIFFERENT THINGS: MORE THAN HALF THE DAYS
GAD7 TOTAL SCORE: 12
7. FEELING AFRAID AS IF SOMETHING AWFUL MIGHT HAPPEN: SEVERAL DAYS
GAD7 TOTAL SCORE: 12
5. BEING SO RESTLESS THAT IT IS HARD TO SIT STILL: SEVERAL DAYS
7. FEELING AFRAID AS IF SOMETHING AWFUL MIGHT HAPPEN: SEVERAL DAYS

## 2024-10-07 ASSESSMENT — PATIENT HEALTH QUESTIONNAIRE - PHQ9
SUM OF ALL RESPONSES TO PHQ QUESTIONS 1-9: 8
10. IF YOU CHECKED OFF ANY PROBLEMS, HOW DIFFICULT HAVE THESE PROBLEMS MADE IT FOR YOU TO DO YOUR WORK, TAKE CARE OF THINGS AT HOME, OR GET ALONG WITH OTHER PEOPLE: SOMEWHAT DIFFICULT
SUM OF ALL RESPONSES TO PHQ QUESTIONS 1-9: 8

## 2024-10-07 NOTE — PROGRESS NOTES
"ROS:  General and Resp. completed and negative except as noted above    Histories: reviewed    OBJECTIVE:                                                    BP (!) 123/94   Pulse 80   Ht 1.67 m (5' 5.75\")   Wt 67.2 kg (148 lb 3.2 oz)   SpO2 99%   BMI 24.10 kg/m    Body mass index is 24.1 kg/m .   APPEARANCE: = Relaxed and in no distress  Resp effort = Calm regular breathing  Breath Sounds = Good air movement with no rales or rhonchi in any lung fields  Heart Rate, Rhythm, & sounds (no Murm)  = Regular rate and rhythm with no S3, S4, or murmur appreciated.     ASSESSMENT/PLAN:                                                    Quality gaps reviewed    Garrett was seen today for stress.    Diagnoses and all orders for this visit:    Stress  ANXIETY:  Has known history of anxiety and has been on medication for this.  The patient does not report any significant side effects of this medication.  The prior symptoms leading to the original diagnosis and decision to start medication therapy are worse.     Appetite is stable.  Sleeping patterns are stable.    Overall, the level of \"racing thoughts\", emotional lability, and ease of agitation are worse.            9/24/2022     8:48 AM 9/14/2023     4:44 PM 10/7/2024     9:34 AM   ADINA-7 SCORE   Total Score 0 (minimal anxiety) 1 (minimal anxiety) 12 (moderate anxiety)   Total Score 0 1 12     Increase zoloft to 200 daily  -     Pending sale to [facility]  Referral; Future    Benign essential hypertension  Reviewed his current HTN management. Discussed our goal for him is a systolic pressure at or below 128 and diastolic pressure at or below 83.  We today managed his prescriptions with refills ensured to ensure availabilty of current medications.  Discussed the importance for aggressive management of HTN to prevent vascular complications later.  Recommended lower fat, lower carbohydrate, and lower sodium (<2000 mg)diet. Required intervals for follow up on HTN, lab studies " reviewed.    Strongly recommened he follow his blood pressures outside the clinic to ensure that BPs are remaining within guidelines,.  Instructed to contact me if the readings are not within guidelines on a regular basis so we can adjust treatment as needed.    -     CBC with Diff/Plt ([facility])  -     Comprehensive metabolic panel; Future    Neurofibromatosis, peripheral, NF1 (H)  stable  Elevated prostate specific antigen (PSA)  Due for recheck  -     VENOUS COLLECTION  -     PSA tumor marker; Future    Screening for prostate cancer  -     PSA tumor marker; Future    Screening for heart disease  -     Lipid Profile ([facility])    Spoke at length about mood disorders including suspected pathophysiology, role of neurotransmitters, and treatment options including medication options ( especially SSRIs that treat cause of sx) and counselling.  Tolerating current meds. We discussed ongoing management of his  medications and how we will refill the medications now and in the future.      Regular exercise    Health maintenance items are reviewed in Epic and correct as of today:    Claudy Juarez MD  [facility]  Family Practice  [facility]  [phone]    For any issues my office # is [phone]      The longitudinal plan of care for the diagnosis(es)/condition(s) as documented were addressed during this visit. Due to the added complexity in care, I will continue to support Garrett in the subsequent management and with ongoing continuity of care.  Answers submitted by the patient for this visit:  Patient Health Questionnaire (Submitted on 10/7/2024)  If you checked off any problems, how difficult have these problems made it for you to do your work, take care of things at home, or get along with other people?: Somewhat difficult  PHQ9 TOTAL SCORE: 8  Patient Health Questionnaire (G7) (Submitted on 10/7/2024)  ADINA 7 TOTAL SCORE: 12  Hypertension Visit (Submitted on 10/7/2024)  Chief Complaint: Chronic  problems general questions HPI Form  Do you check your blood pressure regularly outside of the clinic?: No  Are your blood pressures ever more than 140 on the top number (systolic) OR more than 90 on the bottom number (diastolic)? (For example, greater than 140/90): No  Are you following a low salt diet?: Yes  Depression / Anxiety Questionnaire (Submitted on 10/7/2024)  Chief Complaint: Chronic problems general questions HPI Form  Depression/Anxiety: Depression & Anxiety  Depression & Anxiety (Submitted on 10/7/2024)  Chief Complaint: Chronic problems general questions HPI Form  Status since last visit:: bad  Anxiety since last: : bad  Other associated symptoms of depression:: Yes  Other associated symotome: : Yes  Significant life event: : other  Anxious:: No  Current substance use:: No  General Questionnaire (Submitted on 10/7/2024)  Chief Complaint: Chronic problems general questions HPI Form  What is the reason for your visit today? : Mental stress  How many servings of fruits and vegetables do you eat daily?: 0-1  On average, how many sweetened beverages do you drink each day (Examples: soda, juice, sweet tea, etc.  Do NOT count diet or artificially sweetened beverages)?: 3  How many minutes a day do you exercise enough to make your heart beat faster?: 20 to 29  How many days a week do you exercise enough to make your heart beat faster?: 3 or less  How many days per week do you miss taking your medication?: 1  What makes it hard for you to take your medication every day?: remembering to take

## 2024-10-17 ENCOUNTER — TRANSFERRED RECORDS (OUTPATIENT)
Dept: FAMILY MEDICINE | Facility: CLINIC | Age: 54
End: 2024-10-17

## 2024-11-03 ENCOUNTER — HEALTH MAINTENANCE LETTER (OUTPATIENT)
Age: 54
End: 2024-11-03

## 2024-11-09 ENCOUNTER — OFFICE VISIT (OUTPATIENT)
Dept: URGENT CARE | Facility: URGENT CARE | Age: 54
End: 2024-11-09
Payer: COMMERCIAL

## 2024-11-09 VITALS
DIASTOLIC BLOOD PRESSURE: 80 MMHG | BODY MASS INDEX: 24.07 KG/M2 | HEART RATE: 82 BPM | SYSTOLIC BLOOD PRESSURE: 122 MMHG | WEIGHT: 148 LBS | OXYGEN SATURATION: 98 % | RESPIRATION RATE: 16 BRPM | TEMPERATURE: 97.3 F

## 2024-11-09 DIAGNOSIS — S30.812A PENIS ABRASION, INITIAL ENCOUNTER: Primary | ICD-10-CM

## 2024-11-09 LAB
ALBUMIN UR-MCNC: NEGATIVE MG/DL
APPEARANCE UR: CLEAR
BILIRUB UR QL STRIP: NEGATIVE
COLOR UR AUTO: YELLOW
GLUCOSE UR STRIP-MCNC: NEGATIVE MG/DL
HGB UR QL STRIP: NEGATIVE
KETONES UR STRIP-MCNC: NEGATIVE MG/DL
LEUKOCYTE ESTERASE UR QL STRIP: NEGATIVE
NITRATE UR QL: NEGATIVE
PH UR STRIP: 6 [PH] (ref 5–7)
SP GR UR STRIP: <=1.005 (ref 1–1.03)
UROBILINOGEN UR STRIP-ACNC: 0.2 E.U./DL

## 2024-11-09 PROCEDURE — 81003 URINALYSIS AUTO W/O SCOPE: CPT | Performed by: PHYSICIAN ASSISTANT

## 2024-11-09 PROCEDURE — 99212 OFFICE O/P EST SF 10 MIN: CPT | Performed by: PHYSICIAN ASSISTANT

## 2024-11-09 RX ORDER — DUTASTERIDE 0.5 MG/1
1 CAPSULE, LIQUID FILLED ORAL DAILY
COMMUNITY

## 2024-11-09 NOTE — PROGRESS NOTES
URGENT CARE VISIT:    SUBJECTIVE:    Nhan Bryan is a 53 year old male who  presents today for a purplish discoloration on penis head for the last week. It comes and goes. Denies n/t, pain, or cold skin. He gets erections nightly. No treatments tried.     PMH:   Past Medical History:   Diagnosis Date    Astrocytoma brain tumour 2014    Brain tumor (H)     Depressive disorder     Epilepsy (H)     Hypertension     Neurofibromatosis, peripheral, NF1 (H) 2014    Seizure disorder (H) 2014     Allergies: Patient has no known allergies.   Medications:   Current Outpatient Medications   Medication Sig Dispense Refill    dutasteride (AVODART) 0.5 MG capsule Take 1 capsule by mouth daily.      levETIRAcetam (KEPPRA) 500 MG tablet Take 1 tablet (500 mg) by mouth At Bedtime 60 tablet     levETIRAcetam (KEPPRA) 750 MG tablet Take 2 tablets (1,500 mg) by mouth 2 times daily      lisinopril (ZESTRIL) 20 MG tablet TAKE 1 TABLET BY MOUTH EVERY DAY 90 tablet 3    saw palmetto (SERENOA REPENS) 160 MG capsule Take 3 capsules (480 mg) by mouth daily      sertraline (ZOLOFT) 100 MG tablet Take 2 tablets (200 mg) by mouth daily. 180 tablet 3    VIMPAT 50 MG TABS tablet TAKE 1 TABLET BY MOUTH TWICE A DAY       Social History:   Social History     Tobacco Use    Smoking status: Former     Current packs/day: 0.00     Types: Cigarettes     Quit date: 2002     Years since quittin.8    Smokeless tobacco: Never   Substance Use Topics    Alcohol use: No     Alcohol/week: 0.0 standard drinks of alcohol     Comment: quit        ROS:  Review of systems negative except as stated above.    OBJECTIVE:  /80   Pulse 82   Temp 97.3  F (36.3  C) (Tympanic)   Resp 16   Wt 67.1 kg (148 lb)   SpO2 98%   BMI 24.07 kg/m    GENERAL APPEARANCE: healthy, alert and no distress  RESP: lungs clear to auscultation - no rales, rhonchi or wheezes  CV: regular rates and rhythm, normal S1 S2, no murmur noted  : penis warm  to touch. Faint purpuric discoloration over distal penis shaft. Testicles without masses. NTTP.  SKIN: no suspicious lesions or rashes    Labs:    Results for orders placed or performed in visit on 11/09/24   UA Macroscopic with reflex to Microscopic and Culture - Clinic Collect     Status: Normal    Specimen: Urine, Midstream   Result Value Ref Range    Color Urine Yellow Colorless, Straw, Light Yellow, Yellow    Appearance Urine Clear Clear    Glucose Urine Negative Negative mg/dL    Bilirubin Urine Negative Negative    Ketones Urine Negative Negative mg/dL    Specific Gravity Urine <=1.005 1.003 - 1.035    Blood Urine Negative Negative    pH Urine 6.0 5.0 - 7.0    Protein Albumin Urine Negative Negative mg/dL    Urobilinogen Urine 0.2 0.2, 1.0 E.U./dL    Nitrite Urine Negative Negative    Leukocyte Esterase Urine Negative Negative    Narrative    Microscopic not indicated       ASSESSMENT:     ICD-10-CM    1. Penis abrasion, initial encounter  S30.812A UA Macroscopic with reflex to Microscopic and Culture - Clinic Collect          PLAN:  Patient Instructions   Patient presents with purplish discoloration around distal penis. He has no signs of lack of circulation and still gets nightly erections. No signs of infection. Continue to monitor. See urology if symptoms worsen or do not improve in 7 days. Seek emergency care if you develop severe pain/swelling, paleness, or coldness.     Patient verbalized understanding and is agreeable to plan. The patient was discharged ambulatory and in stable condition.    Lola Whiteside PA-C on 11/9/2024 at 11:48 AM

## 2024-11-09 NOTE — PATIENT INSTRUCTIONS
Patient presents with purplish discoloration around distal penis. He has no signs of lack of circulation and still gets nightly erections. No signs of infection. Continue to monitor. See urology if symptoms worsen or do not improve in 7 days. Seek emergency care if you develop severe pain/swelling, paleness, or coldness.

## 2024-11-15 ENCOUNTER — TRANSFERRED RECORDS (OUTPATIENT)
Dept: FAMILY MEDICINE | Facility: CLINIC | Age: 54
End: 2024-11-15

## 2024-12-18 ENCOUNTER — TRANSFERRED RECORDS (OUTPATIENT)
Dept: FAMILY MEDICINE | Facility: CLINIC | Age: 54
End: 2024-12-18

## 2025-04-17 ENCOUNTER — TRANSFERRED RECORDS (OUTPATIENT)
Dept: FAMILY MEDICINE | Facility: CLINIC | Age: 55
End: 2025-04-17

## 2025-05-11 DIAGNOSIS — I10 BENIGN ESSENTIAL HYPERTENSION: ICD-10-CM

## 2025-05-11 RX ORDER — LISINOPRIL 20 MG/1
20 TABLET ORAL
Qty: 90 TABLET | Refills: 3 | Status: SHIPPED | OUTPATIENT
Start: 2025-05-11

## (undated) RX ORDER — FENTANYL CITRATE 50 UG/ML
INJECTION, SOLUTION INTRAMUSCULAR; INTRAVENOUS
Status: DISPENSED
Start: 2018-04-24

## (undated) RX ORDER — FENTANYL CITRATE 50 UG/ML
INJECTION, SOLUTION INTRAMUSCULAR; INTRAVENOUS
Status: DISPENSED
Start: 2021-10-28